# Patient Record
Sex: FEMALE | Race: BLACK OR AFRICAN AMERICAN | NOT HISPANIC OR LATINO | Employment: OTHER | URBAN - METROPOLITAN AREA
[De-identification: names, ages, dates, MRNs, and addresses within clinical notes are randomized per-mention and may not be internally consistent; named-entity substitution may affect disease eponyms.]

---

## 2022-06-09 ENCOUNTER — TELEPHONE (OUTPATIENT)
Dept: FAMILY MEDICINE | Facility: CLINIC | Age: 37
End: 2022-06-09
Payer: COMMERCIAL

## 2022-06-09 NOTE — TELEPHONE ENCOUNTER
Unable to contact pt at listed number  was  contacted regarding patients bleeding.  reports pt has had some bleeding for the last 2-3 days He reports no dysuria,fever,cramping or pain.   states patient is about 3 months pregnant.  encouraged to have patient present to ER if above mentioned symptoms occur. He voices understanding and agreement patient will be seen 6/10    Note routed to   /ERASMO

## 2022-09-26 ENCOUNTER — ANCILLARY PROCEDURE (OUTPATIENT)
Dept: ULTRASOUND IMAGING | Facility: CLINIC | Age: 37
End: 2022-09-26
Attending: FAMILY MEDICINE
Payer: COMMERCIAL

## 2022-09-26 ENCOUNTER — OFFICE VISIT (OUTPATIENT)
Dept: FAMILY MEDICINE | Facility: CLINIC | Age: 37
End: 2022-09-26
Payer: COMMERCIAL

## 2022-09-26 VITALS
TEMPERATURE: 99.2 F | HEART RATE: 83 BPM | BODY MASS INDEX: 35.34 KG/M2 | DIASTOLIC BLOOD PRESSURE: 76 MMHG | OXYGEN SATURATION: 100 % | RESPIRATION RATE: 16 BRPM | SYSTOLIC BLOOD PRESSURE: 117 MMHG | HEIGHT: 64 IN | WEIGHT: 207 LBS

## 2022-09-26 DIAGNOSIS — N93.9 VAGINAL BLEEDING: ICD-10-CM

## 2022-09-26 DIAGNOSIS — N92.6 MISSED PERIOD: Primary | ICD-10-CM

## 2022-09-26 DIAGNOSIS — N93.9 VAGINAL BLEEDING: Primary | ICD-10-CM

## 2022-09-26 LAB
HCG INTACT+B SERPL-ACNC: ABNORMAL MIU/ML
HCG UR QL: POSITIVE
PROGEST SERPL-MCNC: 4.4 NG/ML

## 2022-09-26 PROCEDURE — 84702 CHORIONIC GONADOTROPIN TEST: CPT

## 2022-09-26 PROCEDURE — 84144 ASSAY OF PROGESTERONE: CPT

## 2022-09-26 PROCEDURE — 76801 OB US < 14 WKS SINGLE FETUS: CPT | Mod: TC | Performed by: RADIOLOGY

## 2022-09-26 PROCEDURE — 36415 COLL VENOUS BLD VENIPUNCTURE: CPT

## 2022-09-26 PROCEDURE — 99204 OFFICE O/P NEW MOD 45 MIN: CPT | Mod: 25

## 2022-09-26 PROCEDURE — 81025 URINE PREGNANCY TEST: CPT

## 2022-09-26 NOTE — PROGRESS NOTES
Preceptor Attestation:    I discussed the patient with the resident and evaluated the patient in person. I have verified the content of the note, which accurately reflects my assessment of the patient and the plan of care.   Supervising Physician:  Sean Morris MD.

## 2022-09-26 NOTE — PROGRESS NOTES
Assessment & Plan:    1. Positive urine pregnancy  Vaginal bleeding in pregnancy  Patient new to clinic. Prior OB at . , with hx of early SAB at 4 weeks and recent loss reported at 4 months in 2022. She reports she delivered the infant at home and then presented to the ED for bleeding, but had no follow up for the loss. She reports LMP 2022 so 10-12 weeks estimated. She has had vaginal bleeding with decreasing pregnancy symptoms for the last week. She strongly hopes this pregnancy is successful but we did discuss this could be a miscarriage. She is not currently taking PNV.  - OB US, transvaginal likely to evaluate cervical length and possible dating  - Serum progesterone  - Serum Beta Hcg today, likely repeat   - Will call patient pending US results and discuss next steps  - If intrauterine with closed cervix, will send to [facility] and have her return for new OB.     Yuri Wade is a 37 year old  with hx of 4 week loss in  and recent loss at approx 4 months in 2022. LMP reported to be 2022. She has been having vaginal spotting/bleeding x 1 week and feels it is getting worse. She does wear a pad but it is minimally saturated.   She delivered her prior pregnancy at home, at which time the infant was fully formed. She had a lot of bleeding and presented to the ED but no further work up for the loss was completed. Her other pregnancies were in 2018, 2019, , and .  She strongly desires a 5th child.   Does endorse some mild back pain.   Denies fever, chills, abdominal pain, no urinary symptoms.       Vaginal Bleeding (spotting) and Prenatal Care (Patient reports being 2 months pregnant via 2 at home test, back pain )      Review of Systems   Constitutional, HEENT, cardiovascular, pulmonary, gi and gu systems are negative, except as otherwise noted.      Objective    /76 (BP Location: Left arm, Patient Position: Sitting, Cuff Size: Adult Large)   Pulse 83   Temp  "99.2  F (37.3  C) (Oral)   Resp 16   Ht 1.625 m (5' 3.98\")   Wt 93.9 kg (207 lb)   SpO2 100%   BMI 35.56 kg/m    Body mass index is 35.56 kg/m .  Physical Exam   GENERAL: healthy, alert and no distress  RESP: lungs clear to auscultation  CV: regular rate and rhythm  ABDOMEN: soft, nontender  MS: no gross musculoskeletal defects noted, no edema    I precepted today with Dr. Morris.    Carmita Garcia MD PGY-2        "

## 2022-09-26 NOTE — PATIENT INSTRUCTIONS
Thank you for taking the time to discuss your health with me today!    Today we discussed:  Your pregnancy! Congratulations on your pregnancy! We will check your hormone levels and I will call you with the next steps. Come back for your ultrasound at 3:40 PM and it will give us more information on next steps.    As always, please call the clinic or message with any questions or concerns.     Best Wishes,  Carmita Garcia MD.

## 2022-09-30 ENCOUNTER — TELEPHONE (OUTPATIENT)
Dept: FAMILY MEDICINE | Facility: CLINIC | Age: 37
End: 2022-09-30

## 2022-09-30 NOTE — TELEPHONE ENCOUNTER
Tried to call pt to assist her with rescheduling her appt with Dr Garcia to follow-up her labs and ultrasound.  Pt cancelled her appt for 9/30/22 and did not reschedule. There was no answer.  Will try again on Monday, 10/3/22.  Ultrasound showed an intrauterine gest sac measuring 7 wks but no fetal pole was seen.  Beta HCG quant was 16,316 which is in the 6-7 wk gest age range and progesterone was very low at 4.4,/NG

## 2022-10-03 NOTE — TELEPHONE ENCOUNTER
Tried calling pt and still no answer and unable to leave VM.  Routed note to Dr Garcia to send a letter./NG

## 2023-05-09 LAB
ABO/RH(D): NORMAL
ANTIBODY SCREEN: NEGATIVE
SPECIMEN EXPIRATION DATE: NORMAL
SPECIMEN EXPIRATION DATE: NORMAL

## 2023-05-10 ENCOUNTER — PRENATAL OFFICE VISIT (OUTPATIENT)
Dept: MIDWIFE SERVICES | Facility: CLINIC | Age: 38
End: 2023-05-10
Payer: COMMERCIAL

## 2023-05-10 VITALS
BODY MASS INDEX: 34.3 KG/M2 | SYSTOLIC BLOOD PRESSURE: 104 MMHG | HEIGHT: 64 IN | DIASTOLIC BLOOD PRESSURE: 60 MMHG | WEIGHT: 200.9 LBS | HEART RATE: 96 BPM

## 2023-05-10 DIAGNOSIS — E04.9 ENLARGED THYROID: ICD-10-CM

## 2023-05-10 DIAGNOSIS — Z91.89 AT RISK FOR COMPLICATION OF PREGNANCY: ICD-10-CM

## 2023-05-10 DIAGNOSIS — O09.529 ANTEPARTUM MULTIGRAVIDA OF ADVANCED MATERNAL AGE: ICD-10-CM

## 2023-05-10 DIAGNOSIS — Z34.80 SUPERVISION OF OTHER NORMAL PREGNANCY, ANTEPARTUM: Primary | ICD-10-CM

## 2023-05-10 PROBLEM — O99.810 GLUCOSE INTOLERANCE OF PREGNANCY: Status: RESOLVED | Noted: 2021-10-13 | Resolved: 2023-05-10

## 2023-05-10 PROBLEM — D64.9 ANEMIA, UNSPECIFIED: Status: RESOLVED | Noted: 2021-10-13 | Resolved: 2023-05-10

## 2023-05-10 PROBLEM — D64.9 ANEMIA, UNSPECIFIED: Status: ACTIVE | Noted: 2021-10-13

## 2023-05-10 PROBLEM — O99.810 GLUCOSE INTOLERANCE OF PREGNANCY: Status: ACTIVE | Noted: 2021-10-13

## 2023-05-10 LAB
BASOPHILS # BLD AUTO: 0 10E3/UL (ref 0–0.2)
BASOPHILS NFR BLD AUTO: 0 %
BURR CELLS BLD QL SMEAR: SLIGHT
EOSINOPHIL # BLD AUTO: 0.1 10E3/UL (ref 0–0.7)
EOSINOPHIL NFR BLD AUTO: 1 %
ERYTHROCYTE [DISTWIDTH] IN BLOOD BY AUTOMATED COUNT: 15.9 % (ref 10–15)
HCT VFR BLD AUTO: 31.5 % (ref 35–47)
HGB BLD-MCNC: 9.4 G/DL (ref 11.7–15.7)
IMM GRANULOCYTES # BLD: 0.2 10E3/UL
IMM GRANULOCYTES NFR BLD: 2 %
LYMPHOCYTES # BLD AUTO: 1.8 10E3/UL (ref 0.8–5.3)
LYMPHOCYTES NFR BLD AUTO: 16 %
MCH RBC QN AUTO: 22.8 PG (ref 26.5–33)
MCHC RBC AUTO-ENTMCNC: 29.8 G/DL (ref 31.5–36.5)
MCV RBC AUTO: 76 FL (ref 78–100)
MONOCYTES # BLD AUTO: 0.7 10E3/UL (ref 0–1.3)
MONOCYTES NFR BLD AUTO: 6 %
NEUTROPHILS # BLD AUTO: 8.4 10E3/UL (ref 1.6–8.3)
NEUTROPHILS NFR BLD AUTO: 75 %
NRBC # BLD AUTO: 0 10E3/UL
NRBC BLD AUTO-RTO: 0 /100
PLAT MORPH BLD: ABNORMAL
PLATELET # BLD AUTO: 279 10E3/UL (ref 150–450)
POLYCHROMASIA BLD QL SMEAR: SLIGHT
RBC # BLD AUTO: 4.13 10E6/UL (ref 3.8–5.2)
RBC MORPH BLD: ABNORMAL
WBC # BLD AUTO: 11.2 10E3/UL (ref 4–11)

## 2023-05-10 PROCEDURE — 99207 PR FIRST OB VISIT: CPT | Performed by: ADVANCED PRACTICE MIDWIFE

## 2023-05-10 PROCEDURE — 86762 RUBELLA ANTIBODY: CPT | Performed by: ADVANCED PRACTICE MIDWIFE

## 2023-05-10 PROCEDURE — 86780 TREPONEMA PALLIDUM: CPT | Performed by: ADVANCED PRACTICE MIDWIFE

## 2023-05-10 PROCEDURE — 86900 BLOOD TYPING SEROLOGIC ABO: CPT | Performed by: ADVANCED PRACTICE MIDWIFE

## 2023-05-10 PROCEDURE — 87389 HIV-1 AG W/HIV-1&-2 AB AG IA: CPT | Performed by: ADVANCED PRACTICE MIDWIFE

## 2023-05-10 PROCEDURE — 36415 COLL VENOUS BLD VENIPUNCTURE: CPT | Performed by: ADVANCED PRACTICE MIDWIFE

## 2023-05-10 PROCEDURE — 87086 URINE CULTURE/COLONY COUNT: CPT | Performed by: ADVANCED PRACTICE MIDWIFE

## 2023-05-10 PROCEDURE — 86901 BLOOD TYPING SEROLOGIC RH(D): CPT | Performed by: ADVANCED PRACTICE MIDWIFE

## 2023-05-10 PROCEDURE — 86850 RBC ANTIBODY SCREEN: CPT | Performed by: ADVANCED PRACTICE MIDWIFE

## 2023-05-10 PROCEDURE — 84443 ASSAY THYROID STIM HORMONE: CPT | Performed by: ADVANCED PRACTICE MIDWIFE

## 2023-05-10 PROCEDURE — 86803 HEPATITIS C AB TEST: CPT | Performed by: ADVANCED PRACTICE MIDWIFE

## 2023-05-10 PROCEDURE — 87340 HEPATITIS B SURFACE AG IA: CPT | Performed by: ADVANCED PRACTICE MIDWIFE

## 2023-05-10 PROCEDURE — 85025 COMPLETE CBC W/AUTO DIFF WBC: CPT | Performed by: ADVANCED PRACTICE MIDWIFE

## 2023-05-10 RX ORDER — VITAMIN A ACETATE, BETA CAROTENE, ASCORBIC ACID, CHOLECALCIFEROL, .ALPHA.-TOCOPHEROL ACETATE, DL-, THIAMINE MONONITRATE, RIBOFLAVIN, NIACINAMIDE, PYRIDOXINE HYDROCHLORIDE, FOLIC ACID, CYANOCOBALAMIN, CALCIUM CARBONATE, FERROUS FUMARATE, ZINC OXIDE, CUPRIC OXIDE 3080; 12; 120; 400; 1; 1.84; 3; 20; 22; 920; 25; 200; 27; 10; 2 [IU]/1; UG/1; MG/1; [IU]/1; MG/1; MG/1; MG/1; MG/1; MG/1; [IU]/1; MG/1; MG/1; MG/1; MG/1; MG/1
1 TABLET, FILM COATED ORAL DAILY
COMMUNITY

## 2023-05-10 NOTE — PATIENT INSTRUCTIONS
Pregnancy: Your Second Trimester Changes  Each day, you and your baby are changing and growing together. Here s a quick look at what s happening to both of you.  How you are changing  Even when you don t notice it, your body is adapting to meet the needs of your growing baby. The changes in your body might also affect your moods.  Your body  Your uterus expands as your baby grows. As the weeks go by, you will feel more pressure on your bladder, stomach, and other organs. You may notice some skin color changes on your forehead, nose, or cheeks. Freckles may darken, and moles may grow. You may notice a darker line on your abdomen between your belly button and pubic bone in the midline.  Your moods  The second trimester is often easier than the first. Still, be prepared for mood swings. These are from the increase in hormones made by your body. Hormones are chemicals that affect the way organs work. These mood swings are a normal part of pregnancy.  How your baby is growing    Month 4  Your baby s heartbeat may be heard with a Doppler (handheld ultrasound device) by 9 to 10 weeks. Eyebrows, eyelashes, and fingernails begin to form.    Month 5  You may feel your baby move. After a growth spurt, your baby nears 10 inches.    Month 6  Your baby s fingerprints have formed. Your baby weighs about 1 to 2 pounds and is about 12 inches long.    [employer] last reviewed this educational content on 7/1/2021 2000-2022 [employer]. All rights reserved. This information is not intended as a substitute for professional medical care. Always follow your healthcare professional's instructions.          Adapting to Pregnancy: Second Trimester  Keep up the healthy habits you started in your first trimester. You might be a little more tired than normal. So plan your day wisely. Look at the tips below and choose the ones that suit your lifestyle.   Note  If you have any questions, talk with your healthcare provider.   If  you work  If you can, adjust your work with your employer to fit your needs. Try these tips:     If you stand for long periods, find ways to do some tasks while sitting. Also, try to stand with 1 foot resting on a low stool or ledge. Shift your weight from foot to foot often. Wear low-heeled shoes.    If you sit, keep your knees level with your hips. Rest your feet on a firm surface. Sit tall with support for your low back.    If you work long hours, ask about adjusting your schedule. Try taking shorter breaks more often.  When you travel  The second trimester may be the best time for any travel. Talk to your healthcare provider about any special plans you may need to make. Always:     Wear a seat belt. Fasten the lap part under your belly. Wear the shoulder part also.    Take breaks often during long trips by car or plane. Move around to stretch your legs.    Drink plenty of fluids on flights. The air in plane cabins is very dry.    Stay out of hot climates or high altitudes if you are not used to them.    Stay away from places where the food and water might make you sick.    Make sure you are up-to-date on all vaccines, including the flu vaccine. This is especially important when traveling overseas.  Taking time to relax  Find time to rest and relax at work or at home:     Take short time-outs daily. Do relaxation exercises.    Breathe deeply during stressful times.    Try not to take on too much. Plan tasks for times when you have the most energy.    Take naps when you can. Or just sit and relax.    After week 16, don't lie on your back for more than a few minutes. Instead, lie on your side. Switch sides often.    Having sex  Unless your healthcare provider tells you otherwise, there is no reason to stop having sex now. Blood supply increases to the pelvic area in the second trimester. Because of this, sex might be more enjoyable. Try different positions and see what s best. Also talk with your partner about any  changes in desire. Spotting may happen after sex. Let your healthcare provider know if there is heavy bleeding.   Keeping your environment safe  You can still clean your house and use scented products. Just take some simple precautions:     Wear gloves when using cleaning fluids.    Open windows to let in fresh air. Use a fan if you paint.    Stay away from secondhand smoke.    Don t breathe fumes from nail polish, hair spray, cleansers, or other chemicals.  How daily issues affect your health  Many things in your daily life impact your health. This can include transportation, money problems, housing, access to food, and . If you can t get to medical appointments, you may not receive the care you need. When money is tight, it may be difficult to pay for medicines. And living far from a grocery store can make it hard to buy healthy food.   If you have concerns in any of these or other areas, talk with your healthcare team. They may know of local resources to assist you. Or they may have a staff person who can help.   [employer] last reviewed this educational content on 6/1/2021 2000-2022 [employer]. All rights reserved. This information is not intended as a substitute for professional medical care. Always follow your healthcare professional's instructions.          Relieving Back Pain During Pregnancy: Wall Stretch, Body Bend   Before trying these exercises, talk to your healthcare provider to make sure they are safe for you. Ask your healthcare provider how many times to do each exercise.   Wall stretch  This strengthens and loosens the muscles in your upper back:   1. Lean against a wall with a firm pillow or rolled towel under your shoulder blades. Your feet should be about 12 inches from the wall and shoulder-width apart. Point your chin down.  2. Breathe in. Push your shoulders, neck, and head against the wall. You will feel a stretch in your shoulders.  3. Hold for 5 counts. Then  breathe out, and relax your shoulders and neck.   Body bend  This strengthens your back and buttocks muscles:   1. Stand with your legs shoulder-width apart. Put your hands on your upper thighs and bend your knees slightly.  2. Slowly bend forward at the hips. Push your hips back and keep your shoulders up. Make sure your back is straight. You ll feel a stretch in your upper thighs. You ll also feel your back muscles holding you in position.  3. Hold for 5 counts, then straighten.   [employer] last reviewed this educational content on 7/1/2021 2000-2022 [employer]. All rights reserved. This information is not intended as a substitute for professional medical care. Always follow your healthcare professional's instructions.          Blood Glucose Screening During Pregnancy  Gestational diabetes is diabetes that only pregnant women get. Changes in your body during pregnancy can cause high blood sugar (glucose). This can cause problems for you and your baby. It is a serious condition. But it can be controlled.     Your healthcare provider will talk with you about blood glucose screening.     Who is at risk for gestational diabetes?  You are at risk of getting gestational diabetes if any of the risk factors below apply to you. The risk for this condition gets higher as your number of risk factors increases:    You are , , , , or .    You weigh more than your healthcare provider says is healthy for you.    You have a relative with diabetes.    You are older than 25.    You had gestational diabetes during a past pregnancy.    You had a stillbirth or a very large baby before.    You have a history of abnormal glucose tolerance.    You have sugar in your urine at the first prenatal visit.    You have metabolic syndrome, PCOS (polycystic ovary syndrome), are using glucocorticoids, or have high blood pressure.    You are pregnant with twins or  more  What happens during a screening?  Here is what to expect during a blood glucose screening:    There is conflicting advice for screening. But the American College of Obstetricians and Gynecologists currently advises that all pregnant women be screened for gestational diabetes. When you are screened depends on your risk. Women are tested at 24 to 28 weeks of pregnancy. Women at high risk may be tested when they first learn they are pregnant.    To do the screening, a blood sample is taken. Your blood sugar level is measured.    If the results show a high blood sugar level, a glucose tolerance test may be ordered. You will drink a certain amount of sugar. This test measures how long it takes for sugar to leave your blood. The test will show if you have gestational diabetes.  What to know if you test positive  Here are some things you need to know:    Gestational diabetes can be treated. The best way to control it is to find out you have it early and start treatment quickly.    This condition can cause problems for the mother during pregnancy. It can also cause problems with the baby during pregnancy, delivery, and after. Treatment greatly lowers the chance for problems.    The changes in your body that cause gestational diabetes normally happen only when you are pregnant. After the baby is born, your body goes back to normal. The condition goes away. But you may be more likely to have type 2 diabetes later. Talk with your healthcare provider about ways to help prevent type 2 diabetes.  Treating gestational diabetes  Here is how to treat gestational diabetes:    You ll need to check your blood sugar often. You can do this at home. Prick your finger and check a drop of blood on a glucose monitor. Your healthcare provider will show you how and when to check your blood sugar. They will talk about your target blood sugar level.    To manage your blood sugar, you will be given a special plan. It will likely include  meal planning and getting regular exercise. Some women need to take a hormone called insulin. Others may take medicine to help control their blood sugar.  [employer] last reviewed this educational content on 8/1/2020 2000-2022 [employer]. All rights reserved. This information is not intended as a substitute for professional medical care. Always follow your healthcare professional's instructions.

## 2023-05-10 NOTE — PROGRESS NOTES
PRENATAL VISIT   FIRST OBSTETRICAL EXAM - OB      Assessment / Impression   First prenatal visit at unknown gestation, LMP: Nov or Dec 2022  38 year old   AMA  Pre-pregnant BMI 34.8  No evidence of  mood disorder  Enlarged Thyroid, Hx nodules  Hx Anemia  Poor fluid intake, eats ice  Likely Anemic now  Hx elevated GCT in pregnancy in  (140 mg/dL), never took the 3-hour GTT and was not diagnosed with GDM    Plan:   -Discussed and ordered dating US and FAS if possible to complete based on dating.  -IOB labs drawn including TSH reflex. -Next visit, ask if she has met with endocrinology elsewhere since her thyroid imaging in . Encourage endocrinology referral if not connected yet.  -Pt is not a candidate for drawing lead level per [facility] screening tool.   -Patient is not interested in waterbirth.    -Reviewed prenatal care schedule.   -Optimal nutrition and weight gain discussed. Pregnancy weight gain of 2-11 lbs (BMI 35-39.9) encouraged.   -Anticipatory guidance for common pregnancy questions and concerns reviewed.   -Danger s/sx for this trimester reviewed with patient.   -Reviewed genetic and carrier screening options with patient; she is undecided at this time. Has not had genetic screening in the past. Will let us know if she decides.  -IOB packet given and reviewed with patient.   -CNM services and hospital options reviewed; emergency and scheduling phone numbers given to patient.   -Because the patient does have 2 or more moderate risk factors, we discussed the benefit of starting low-dose aspirin even though she is likely further than 16 weeks gestation, however she is declining this recommendation.  -Antepartum VTE risk factors absent.  -Patient is not at increased risk for overt diabetes, so early 1hr GCT will be added to IOB labs today.  -Pt is not a candidate for an antepartum OB consult.    -Return to clinic in 4 weeks or sooner as needed.  -Strongly encouraged increasing her water  intake as well as taking a PNV and focusing on iron rich foods.     Total time: 40 minutes spent on the date of the encounter doing chart review, review of test results, patient visit and documentation.     Subjective:   Mauro Irizarry is a 38 year old  here today for her first obstetrical exam at unknown. Here with FOB Adnan. The patient reports nausea, rare vomiting, fatigue and some mild breast tenderness. She is usually nauseated throughout all of her pregnancies and does not drink very much water. Does eat ice and drinks juice. Has headaches and dry skin and discussed this is likely due to dehydration. States she has never been a water drinker and likely will not be able to increase water intake. She has tried fruit in water and other methods to increase water in the past but still struggles.  Uncertain LMP in November or December. States she had insurance plan change so that is reason for late start to prenatal care. Has been feeling fetal movement for about 1 month now.    States her thyroid actually feels smaller to her at this time. Was enlarged in the past and this is evident today.    Offered GC/CT screening today and patient accepts for next visit via urine.    Risk factors: pre-pregnancy obesity. Pregnancy Risk Factors:Age is <18 or >35 and Initial prenatal visit >20 weeks (likely >20 weeks)    The patient has the following high risk factors for preeclampsia:none. The patient has the following moderate risk factors for preeclampsia:maternal age 35+, BMI greater than 30 and sociodemographic characteristics (-American, low SES).     The patient has the following antepartum risk factors for VTE (two or more risk factors, or 1 * risk factor, places patient at higher risk): none.     Clinical history/risk factors requiring antepartum OB consult: none.     The patient has the following risk factors for overt diabetes: Body mass index greater than or equal to 25 kg/m2. Plus the additional risk  factor(s) of: High-risk race/ethnicity (eg, , , ,  American, ) and No additional risk factors.    Social History:   Education level: HS   Occupation: [employer]   Partners name: Jeb   ?   OB History    Para Term  AB Living   8 4 4 0 3 4   SAB IAB Ectopic Multiple Live Births   3 0 0 0 4      # Outcome Date GA Lbr Mal/2nd Weight Sex Delivery Anes PTL Lv   8 Current            7 2022     SAB      6 2022 5w0d    SAB      5 Term 10/14/21 39w5d 04:50 / 00:16 3.43 kg (7 lb 9 oz) M Vag-Spont  N JAJA      Name: NOLAYNE VALERIOBOY NAJLA      Apgar1: 9  Apgar5: 9   4 Term 20 38w4d 03:55 / 00:03 3.037 kg (6 lb 11.1 oz) F Vag-Spont None N JAJA      Name: NOOR,BABYGIRL NAJLA      Apgar1: 8  Apgar5: 9   3 Term 19 40w2d / 00:17 3.65 kg (8 lb 0.8 oz) M Vag-Spont  N JAJA      Name: NOLAYNE VALERIOBOY NAJLA      Apgar1: 9  Apgar5: 9   2 SAB 2018 4w0d          1 Term 18 38w1d 05:40 / 00:24 2.52 kg (5 lb 8.9 oz) M Vag-Spont  N JAJA      Name: Dalton      Apgar1: 9  Apgar5: 9        History:   Past Medical History:   Diagnosis Date     Anemia, unspecified 10/13/2021    Formatting of this note might be different from the original. 8.6 on admission     Glucose intolerance of pregnancy 10/13/2021    Failed GCT- no GTT      Past Surgical History:   Procedure Laterality Date     NO PAST SURGERIES        Family History   Problem Relation Age of Onset     No Known Problems Mother      Hypertension Father      No Known Problems Son      No Known Problems Son      No Known Problems Son      No Known Problems Daughter       Social History     Tobacco Use     Smoking status: Never     Passive exposure: Never     Smokeless tobacco: Never   Substance Use Topics     Alcohol use: Never     Drug use: Never      Current Outpatient Medications   Medication Sig Dispense Refill     Prenatal Vit-Fe Fumarate-FA (PRENATAL PLUS) 27-1 MG TABS Take 1 tablet by mouth daily        No  "Known Allergies     The patient's medical, surgical and family histories were reviewed.    Pap smear: Last Pap: 2017, Result: normal, Any history of abnormal: no. Next Due: Desires to be done postpartum.   EPDS score today: 0.\"never\" to #10   History of anxiety or depression: no    Review of Systems   General: Fatigue but otherwise denies problem   Eyes: Denies problem   Ears/Nose/Throat: Denies problem   Cardiovascular: Denies problem   Respiratory: Denies problem   Gastrointestinal: Nausea and rare vomiting  Genitourinary: Denies any discharge, vaginal bleeding or itchiness  Musculoskeletal: Breast tenderness otherwise denies problem  Skin: Denies problem   Neurologic: Denies problem   Psychiatric: Denies problem   Endocrine: Denies problem   Heme/Lymphatic: Denies problem   Allergic/Immunologic: Denies problem         Objective:   Objective    Vitals:    05/10/23 1524   BP: 104/60   Pulse: 96   Weight: 91.1 kg (200 lb 14.4 oz)   Height: 1.613 m (5' 3.5\")        Physical Exam:   General Appearance: Alert, cooperative, no distress, appears stated age   HEENT: Normocephalic, without obvious abnormality, atraumatic. Conjunctiva/corneas clear.  Neck:soft, supple, trachea midline  Thyroid: enlarged bilaterally, symmetric, no tenderness (Hx nodes)  Back: Symmetric, no curvature, ROM normal, no CVA tenderness   Lungs: Clear to auscultation bilaterally, respirations unlabored   Heart: Regular rate and rhythm, S1 and S2 normal, no murmur, rub, or gallop. No edema to lower extremities.   Breasts: deferred.  Abdomen: Gravid, soft, non-tender, bowel sounds active all four quadrants, no masses.   FHT: 150  Pelvic: deferred, asymptomatic.  Musculoskeletal: Extremities normal, atraumatic, no cyanosis   Skin: Skin color, texture, turgor normal, no rashes or lesions   Lymph nodes: Cervical, supraclavicular, and axillary nodes normal   Neurologic: Alert and oriented x 3. Normal speech       Leda Quiñones CNM      After visit " chart review:  Has had normal thyroid lab work in the past and an US on 8/16/2021 showing the following:  Nodule 1: There is a 6.7 x 5.0 x 2.8 cm nodule involving the right lobe of the thyroid.   Composition: Solid or almost completely solid, 2 points   Echogenicity: Hyperechoic or isoechoic, 1 point   Shape: Wider-than-tall, 0 points   Margin: Smooth, 0 points   Echogenic Foci: None, or large comet-tail artifacts, 0 points   Point Total: 3 points. TI-RADS 3. If 2.5 cm or larger, recommend FNA; if 1.5 cm or larger, recommend follow up US at 1, 3, and 5 years  IMPRESSION:   A 6.7 cm TI-RADS 3 right lobe thyroid nodule. This meets criteria for fine-needle aspiration.

## 2023-05-11 ENCOUNTER — PATIENT OUTREACH (OUTPATIENT)
Dept: ONCOLOGY | Facility: CLINIC | Age: 38
End: 2023-05-11
Payer: COMMERCIAL

## 2023-05-11 ENCOUNTER — TELEPHONE (OUTPATIENT)
Dept: MIDWIFE SERVICES | Facility: CLINIC | Age: 38
End: 2023-05-11
Payer: COMMERCIAL

## 2023-05-11 DIAGNOSIS — O99.019 ANTEPARTUM ANEMIA: Primary | ICD-10-CM

## 2023-05-11 PROBLEM — Z91.89 AT RISK FOR COMPLICATION OF PREGNANCY: Status: ACTIVE | Noted: 2023-05-11

## 2023-05-11 LAB
HBV SURFACE AG SERPL QL IA: NONREACTIVE
HCV AB SERPL QL IA: NONREACTIVE
HIV 1+2 AB+HIV1 P24 AG SERPL QL IA: NONREACTIVE
RUBV IGG SERPL QL IA: 6.52 INDEX
RUBV IGG SERPL QL IA: POSITIVE
T PALLIDUM AB SER QL: NONREACTIVE
TSH SERPL DL<=0.005 MIU/L-ACNC: 1.45 UIU/ML (ref 0.3–4.2)

## 2023-05-11 NOTE — PROGRESS NOTES
Referral received for benign heme services, see below.    Referral reason: Anemia; abnormal RBC morphology in setting of pregnant patient    Current abnormal labs: Available in Chart Review    Outreach: Call not placed to patient regarding referral.    Plan: Triage instructions updated and sent to NPS for completion.

## 2023-05-12 LAB — BACTERIA UR CULT: NORMAL

## 2023-06-05 ENCOUNTER — HOSPITAL ENCOUNTER (OUTPATIENT)
Dept: ULTRASOUND IMAGING | Facility: CLINIC | Age: 38
Discharge: HOME | End: 2023-06-05
Attending: ADVANCED PRACTICE MIDWIFE | Admitting: ADVANCED PRACTICE MIDWIFE
Payer: COMMERCIAL

## 2023-06-05 DIAGNOSIS — O09.529 ANTEPARTUM MULTIGRAVIDA OF ADVANCED MATERNAL AGE: ICD-10-CM

## 2023-06-05 DIAGNOSIS — Z34.80 SUPERVISION OF OTHER NORMAL PREGNANCY, ANTEPARTUM: ICD-10-CM

## 2023-06-05 PROCEDURE — 76805 OB US >/= 14 WKS SNGL FETUS: CPT

## 2023-06-06 ENCOUNTER — DOCUMENTATION ONLY (OUTPATIENT)
Dept: MIDWIFE SERVICES | Facility: CLINIC | Age: 38
End: 2023-06-06
Payer: COMMERCIAL

## 2023-06-06 DIAGNOSIS — Z34.93: ICD-10-CM

## 2023-06-14 ENCOUNTER — PRENATAL OFFICE VISIT (OUTPATIENT)
Dept: MIDWIFE SERVICES | Facility: CLINIC | Age: 38
End: 2023-06-14
Payer: COMMERCIAL

## 2023-06-14 ENCOUNTER — ONCOLOGY VISIT (OUTPATIENT)
Dept: ONCOLOGY | Facility: HOSPITAL | Age: 38
End: 2023-06-14
Attending: ADVANCED PRACTICE MIDWIFE
Payer: COMMERCIAL

## 2023-06-14 VITALS
WEIGHT: 202.2 LBS | HEIGHT: 64 IN | SYSTOLIC BLOOD PRESSURE: 123 MMHG | DIASTOLIC BLOOD PRESSURE: 56 MMHG | BODY MASS INDEX: 34.52 KG/M2 | RESPIRATION RATE: 18 BRPM | OXYGEN SATURATION: 98 % | TEMPERATURE: 98.5 F | HEART RATE: 102 BPM

## 2023-06-14 VITALS
DIASTOLIC BLOOD PRESSURE: 56 MMHG | BODY MASS INDEX: 34.54 KG/M2 | WEIGHT: 202.3 LBS | HEIGHT: 64 IN | SYSTOLIC BLOOD PRESSURE: 100 MMHG | HEART RATE: 100 BPM

## 2023-06-14 DIAGNOSIS — D50.8 OTHER IRON DEFICIENCY ANEMIA: ICD-10-CM

## 2023-06-14 DIAGNOSIS — D50.8 OTHER IRON DEFICIENCY ANEMIA: Primary | ICD-10-CM

## 2023-06-14 DIAGNOSIS — D50.9 IRON DEFICIENCY ANEMIA, UNSPECIFIED IRON DEFICIENCY ANEMIA TYPE: ICD-10-CM

## 2023-06-14 DIAGNOSIS — O99.019 ANTEPARTUM ANEMIA: ICD-10-CM

## 2023-06-14 DIAGNOSIS — L29.9 ITCHING: ICD-10-CM

## 2023-06-14 DIAGNOSIS — E04.1 THYROID NODULE: ICD-10-CM

## 2023-06-14 DIAGNOSIS — Z34.80 SUPERVISION OF OTHER NORMAL PREGNANCY, ANTEPARTUM: Primary | ICD-10-CM

## 2023-06-14 LAB
ERYTHROCYTE [DISTWIDTH] IN BLOOD BY AUTOMATED COUNT: 16.5 % (ref 10–15)
FERRITIN SERPL-MCNC: 5 NG/ML (ref 6–175)
GLUCOSE 1H P 50 G GLC PO SERPL-MCNC: 117 MG/DL (ref 70–129)
HCT VFR BLD AUTO: 29.7 % (ref 35–47)
HGB BLD-MCNC: 8.8 G/DL (ref 11.7–15.7)
HGB BLD-MCNC: 8.8 G/DL (ref 11.7–15.7)
IRON BINDING CAPACITY (ROCHE): 520 UG/DL (ref 240–430)
IRON SATN MFR SERPL: 5 % (ref 15–46)
IRON SERPL-MCNC: 27 UG/DL (ref 37–145)
MCH RBC QN AUTO: 21.7 PG (ref 26.5–33)
MCHC RBC AUTO-ENTMCNC: 29.6 G/DL (ref 31.5–36.5)
MCV RBC AUTO: 73 FL (ref 78–100)
PLATELET # BLD AUTO: 239 10E3/UL (ref 150–450)
RBC # BLD AUTO: 4.06 10E6/UL (ref 3.8–5.2)
WBC # BLD AUTO: 11 10E3/UL (ref 4–11)

## 2023-06-14 PROCEDURE — 36415 COLL VENOUS BLD VENIPUNCTURE: CPT | Performed by: MIDWIFE

## 2023-06-14 PROCEDURE — 99203 OFFICE O/P NEW LOW 30 MIN: CPT | Performed by: INTERNAL MEDICINE

## 2023-06-14 PROCEDURE — 82950 GLUCOSE TEST: CPT | Performed by: MIDWIFE

## 2023-06-14 PROCEDURE — 83550 IRON BINDING TEST: CPT | Performed by: MIDWIFE

## 2023-06-14 PROCEDURE — 85018 HEMOGLOBIN: CPT | Mod: 59 | Performed by: MIDWIFE

## 2023-06-14 PROCEDURE — 82728 ASSAY OF FERRITIN: CPT | Performed by: MIDWIFE

## 2023-06-14 PROCEDURE — 83540 ASSAY OF IRON: CPT | Performed by: MIDWIFE

## 2023-06-14 PROCEDURE — 87591 N.GONORRHOEAE DNA AMP PROB: CPT | Performed by: MIDWIFE

## 2023-06-14 PROCEDURE — 82306 VITAMIN D 25 HYDROXY: CPT | Performed by: MIDWIFE

## 2023-06-14 PROCEDURE — G0463 HOSPITAL OUTPT CLINIC VISIT: HCPCS | Performed by: INTERNAL MEDICINE

## 2023-06-14 PROCEDURE — 86780 TREPONEMA PALLIDUM: CPT | Performed by: MIDWIFE

## 2023-06-14 PROCEDURE — 85027 COMPLETE CBC AUTOMATED: CPT | Performed by: MIDWIFE

## 2023-06-14 PROCEDURE — 87491 CHLMYD TRACH DNA AMP PROBE: CPT | Performed by: MIDWIFE

## 2023-06-14 PROCEDURE — 99207 PR PRENATAL VISIT: CPT | Performed by: MIDWIFE

## 2023-06-14 RX ORDER — OMEPRAZOLE 20 MG/1
20 TABLET, DELAYED RELEASE ORAL DAILY
COMMUNITY

## 2023-06-14 RX ORDER — DIPHENHYDRAMINE HYDROCHLORIDE 50 MG/ML
50 INJECTION INTRAMUSCULAR; INTRAVENOUS
Status: CANCELLED
Start: 2023-06-21

## 2023-06-14 RX ORDER — ACETAMINOPHEN 500 MG
500-1000 TABLET ORAL EVERY 6 HOURS PRN
COMMUNITY

## 2023-06-14 RX ORDER — MEPERIDINE HYDROCHLORIDE 25 MG/ML
25 INJECTION INTRAMUSCULAR; INTRAVENOUS; SUBCUTANEOUS EVERY 30 MIN PRN
Status: CANCELLED | OUTPATIENT
Start: 2023-06-21

## 2023-06-14 RX ORDER — ALBUTEROL SULFATE 0.83 MG/ML
2.5 SOLUTION RESPIRATORY (INHALATION)
Status: CANCELLED | OUTPATIENT
Start: 2023-06-21

## 2023-06-14 RX ORDER — MULTIVIT WITH MINERALS/LUTEIN
250 TABLET ORAL DAILY
Qty: 90 TABLET | Refills: 3 | Status: SHIPPED | OUTPATIENT
Start: 2023-06-14

## 2023-06-14 RX ORDER — EPINEPHRINE 1 MG/ML
0.3 INJECTION, SOLUTION INTRAMUSCULAR; SUBCUTANEOUS EVERY 5 MIN PRN
Status: CANCELLED | OUTPATIENT
Start: 2023-06-21

## 2023-06-14 RX ORDER — METHYLPREDNISOLONE SODIUM SUCCINATE 125 MG/2ML
125 INJECTION, POWDER, LYOPHILIZED, FOR SOLUTION INTRAMUSCULAR; INTRAVENOUS
Status: CANCELLED
Start: 2023-06-21

## 2023-06-14 RX ORDER — ALBUTEROL SULFATE 90 UG/1
1-2 AEROSOL, METERED RESPIRATORY (INHALATION)
Status: CANCELLED
Start: 2023-06-21

## 2023-06-14 RX ORDER — LORATADINE 10 MG/1
10 TABLET ORAL DAILY
Qty: 90 TABLET | Refills: 0 | Status: SHIPPED | OUTPATIENT
Start: 2023-06-14

## 2023-06-14 RX ORDER — HEPARIN SODIUM,PORCINE 10 UNIT/ML
5-20 VIAL (ML) INTRAVENOUS DAILY PRN
Status: CANCELLED | OUTPATIENT
Start: 2023-06-21

## 2023-06-14 RX ORDER — LANOLIN ALCOHOL/MO/W.PET/CERES
1000 CREAM (GRAM) TOPICAL DAILY
Qty: 90 TABLET | Refills: 3 | Status: SHIPPED | OUTPATIENT
Start: 2023-06-14

## 2023-06-14 RX ORDER — HEPARIN SODIUM (PORCINE) LOCK FLUSH IV SOLN 100 UNIT/ML 100 UNIT/ML
5 SOLUTION INTRAVENOUS
Status: CANCELLED | OUTPATIENT
Start: 2023-06-21

## 2023-06-14 ASSESSMENT — PAIN SCALES - GENERAL: PAINLEVEL: NO PAIN (0)

## 2023-06-14 NOTE — PROGRESS NOTES
"Oncology Rooming Note    June 14, 2023 3:10 PM   Mauro Irizarry is a 38 year old female who presents for:    Chief Complaint   Patient presents with     Hematology     Antepartum Anemia     Initial Vitals: /56   Pulse 102   Temp 98.5  F (36.9  C)   Resp 18   Ht 1.626 m (5' 4\")   Wt 91.7 kg (202 lb 3.2 oz)   LMP  (LMP Unknown)   SpO2 98%   BMI 34.71 kg/m   Estimated body mass index is 34.71 kg/m  as calculated from the following:    Height as of this encounter: 1.626 m (5' 4\").    Weight as of this encounter: 91.7 kg (202 lb 3.2 oz). Body surface area is 2.03 meters squared.  No Pain (0) Comment: Data Unavailable   No LMP recorded (lmp unknown). Patient is pregnant.  Allergies reviewed: Yes  Medications reviewed: Yes    Medications: Medication refills not needed today.  Pharmacy name entered into [employer]:    [id number] - [place], [place] - [address] [place] [address] & [facility] #[id number] - [place], [place] - [address]    Clinical concerns: None       Sudha Rivera LPN            "

## 2023-06-14 NOTE — LETTER
6/14/2023         RE: Mauro Irizarry  [address]  [place] [address]        Dear Colleague,    Thank you for referring your patient, Mauro Irizarry, to the [facility] [place]. Please see a copy of my visit note below.    [facility] Hematology and Oncology Consult Note    Patient: Mauro Irizarry  MRN: [id number]  Date of Service: 06/14/2023      Reason for Visit    Chief Complaint   Patient presents with     Hematology     Antepartum Anemia         Assessment/Plan    Problem List Items Addressed This Visit    None  Visit Diagnoses     Other iron deficiency anemia    -  Primary    Relevant Orders    Iron & Iron Binding Capacity    Ferritin    Antepartum anemia            Iron deficiency anemia in the setting of pregnancy  I reviewed her labs today.  Hemoglobin came back at 8.8.  MCV 73.  Normal white count and normal platelet counts.  Iron studies show a transferrin saturation of 5%.  TIBC is up at 520.  Total iron is 27 and ferritin is 5.  She has classical iron deficiency.  Is currently not on any oral iron supplementation.  She is pretty symptomatic.  I recommended IV iron with IV Venofer x3.  Repeat labs 3 weeks after the last infusion.  I also strongly encouraged her to continue oral iron supplementation.  She is agreeable to the plan.  I reviewed the potential side effects and complications associated with iron infusions.  She understands the risks.  She also understands the benefits.  We will schedule infusions as soon as possible.  I will see her back in 2 months with repeat labs.    ECOG Performance    0 - Independent    Problem List    Patient Active Problem List   Diagnosis     Enlarged thyroid     Supervision of other normal pregnancy, antepartum     Antepartum multigravida of advanced maternal age     At risk for complication of pregnancy     BMI 34.0-34.9,adult     Suboptimally dated pregnancy      ______________________________________________________________________________    Staging History    Cancer Staging   No matching staging information was found for the patient.      History of presenting illness:  Mauro Irizarry is a 28-year-old female who is currently pregnant with her fifth child and in her third trimester who has been referred by her obstetrics provider for further management of antepartum anemia.    This is her eighth pregnancy.  She has 4 kids.  Has 3 abortions.  She has not been taking any prenatal vitamins.  Lab studies back in May showed anemia with hemoglobin of 9.4.  MCV was 76.  Iron studies were not done.  She complains of some fatigue.  Has shortness of breath on exertion.  Denies any vaginal bleeding.  Denies any rectal bleeding.  She also has history of 3 abortions.  No prior history of any blood clots.  Never smoker.  No alcohol use.    Past History    Past Medical History:   Diagnosis Date     Anemia, unspecified 10/13/2021    Formatting of this note might be different from the original. 8.6 on admission     Glucose intolerance of pregnancy 10/13/2021    Failed GCT- no GTT    Family History   Problem Relation Age of Onset     No Known Problems Mother      Hypertension Father      No Known Problems Son      No Known Problems Son      No Known Problems Son      No Known Problems Daughter       Past Surgical History:   Procedure Laterality Date     NO PAST SURGERIES      Social History     Socioeconomic History     Marital status:      Spouse name: Jeb     Number of children: 4     Years of education: Not on file     Highest education level: High school graduate   Occupational History     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   Tobacco Use     Smoking status: Never     Passive exposure: Never     Smokeless tobacco: Never   Vaping Use     Vaping status: Not on file   Substance and Sexual Activity     Alcohol use: Never     Drug use: Never     Sexual activity: Yes     Partners: Male    Other Topics Concern     Not on file   Social History Narrative     Not on file     Social Determinants of Health     Financial Resource Strain: Not on file   Food Insecurity: Not on file   Transportation Needs: Not on file   Physical Activity: Not on file   Stress: Not on file   Social Connections: Not on file   Intimate Partner Violence: Not on file   Housing Stability: Not on file        Allergies    No Known Allergies    Review of Systems    Pertinent items are noted in HPI.      Physical Exam        6/14/2023     1:16 PM   Oncology Vitals   Height 161 cm   Weight 91.763 kg   BSA (m2) 2.03 m2   /56   Pulse 100       General: alert and cooperative  HEENT: Head: Normal, normocephalic, atraumatic.  Eye: Normal external eye, conjunctiva, lids cornea, KEL.  Extremities: atraumatic, no peripheral edema  CNS: Alert and oriented x3, neurologic exam grossly normal.        Lab Results    Recent Results (from the past 168 hour(s))   Hemoglobin   Result Value Ref Range    Hemoglobin 8.8 (L) 11.7 - 15.7 g/dL   Glucose tolerance gest screen 1 hour   Result Value Ref Range    Glu Gest Screen 1hr 50g 117 70 - 129 mg/dL       Imaging Results    US OB > 14 Weeks    Result Date: 6/6/2023  EXAM: US OB > 14 WEEKS LOCATION: [facility] DATE/TIME: 6/5/2023 6:03 PM CDT INDICATION: Fetal anatomy scan, dating as well please. Unsure LMP (November?) COMPARISON: Ultrasound obstetric <14 weeks single gestation transabdominal study 09/26/2022 TECHNIQUE: Routine. FINDINGS: Single intrauterine gestation, longitudinal lie and cephalic presentation. Placenta is located anterior fundal, without previa. Amniotic fluid is normal, MVP 6.3 cm. Uterus is normal. Maternal adnexa (right and left ovaries) show no abnormalities. FETAL ANOMALY SCREEN: Survey of the fetal anatomy is normal. Specifically, normal posterior fossa, lateral ventricles, spine, stomach, bladder, kidneys, cord insertion, three-vessel cord,  "four-chamber heart, outflow tracts, extremities, face, and diaphragm.  BIOMETRY: Biparietal Diameter: 7.8 cm, 31 weeks 4 days Head Circumference: 28.6 cm, 31 weeks 4 days Abdominal Circumference: 26.0 cm, 30 weeks 1 day Femur Length: 6.2 cm, 32 weeks 1 day Estimated Fetal Weight: 1680 g Fetal Heart Rate: 146 bpm Cervical Length: 5.3 cm, no funneling EDC by This US exam: 08/04/2023 Composite Age by This US: 31 weeks 3 days     IMPRESSION:  1.  Single living intrauterine gestation. 2.  Based on current study, composite age of 31 weeks 3 days with EDC 08/04/2023. 3.  Normal fetal survey.      A total of 30 minutes was spent today on this visit including face to face conversation with the patient, EMR review (labs, imaging studies, pathology reports and outside records), counseling and care co-ordination and documentation.    Signed by: Percy Vasques MD      Oncology Rooming Note    June 14, 2023 3:10 PM   Mauro Irizarry is a 38 year old female who presents for:    Chief Complaint   Patient presents with     Hematology     Antepartum Anemia     Initial Vitals: /56   Pulse 102   Temp 98.5  F (36.9  C)   Resp 18   Ht 1.626 m (5' 4\")   Wt 91.7 kg (202 lb 3.2 oz)   LMP  (LMP Unknown)   SpO2 98%   BMI 34.71 kg/m   Estimated body mass index is 34.71 kg/m  as calculated from the following:    Height as of this encounter: 1.626 m (5' 4\").    Weight as of this encounter: 91.7 kg (202 lb 3.2 oz). Body surface area is 2.03 meters squared.  No Pain (0) Comment: Data Unavailable   No LMP recorded (lmp unknown). Patient is pregnant.  Allergies reviewed: Yes  Medications reviewed: Yes    Medications: Medication refills not needed today.  Pharmacy name entered into [employer]:    [id number] - [place], [place] - [address] [id number] - [place], [place] - [address]    Clinical concerns: None       Sudha Rivera, " LPN                Again, thank you for allowing me to participate in the care of your patient.        Sincerely,        Percy Vasques MD

## 2023-06-14 NOTE — PROGRESS NOTES
[facility] Hematology and Oncology Consult Note    Patient: Mauro Irizarry  MRN: [id number]  Date of Service: 06/14/2023      Reason for Visit    Chief Complaint   Patient presents with     Hematology     Antepartum Anemia         Assessment/Plan    Problem List Items Addressed This Visit    None  Visit Diagnoses     Other iron deficiency anemia    -  Primary    Relevant Orders    Iron & Iron Binding Capacity    Ferritin    Antepartum anemia            Iron deficiency anemia in the setting of pregnancy  I reviewed her labs today.  Hemoglobin came back at 8.8.  MCV 73.  Normal white count and normal platelet counts.  Iron studies show a transferrin saturation of 5%.  TIBC is up at 520.  Total iron is 27 and ferritin is 5.  She has classical iron deficiency.  Is currently not on any oral iron supplementation.  She is pretty symptomatic.  I recommended IV iron with IV Venofer x3.  Repeat labs 3 weeks after the last infusion.  I also strongly encouraged her to continue oral iron supplementation.  She is agreeable to the plan.  I reviewed the potential side effects and complications associated with iron infusions.  She understands the risks.  She also understands the benefits.  We will schedule infusions as soon as possible.  I will see her back in 2 months with repeat labs.    ECOG Performance    0 - Independent    Problem List    Patient Active Problem List   Diagnosis     Enlarged thyroid     Supervision of other normal pregnancy, antepartum     Antepartum multigravida of advanced maternal age     At risk for complication of pregnancy     BMI 34.0-34.9,adult     Suboptimally dated pregnancy     ______________________________________________________________________________    Staging History    Cancer Staging   No matching staging information was found for the patient.      History of presenting illness:  Mauro Irizarry is a 28-year-old female who is currently pregnant with her fifth child and in her third trimester  who has been referred by her obstetrics provider for further management of antepartum anemia.    This is her eighth pregnancy.  She has 4 kids.  Has 3 abortions.  She has not been taking any prenatal vitamins.  Lab studies back in May showed anemia with hemoglobin of 9.4.  MCV was 76.  Iron studies were not done.  She complains of some fatigue.  Has shortness of breath on exertion.  Denies any vaginal bleeding.  Denies any rectal bleeding.  She also has history of 3 abortions.  No prior history of any blood clots.  Never smoker.  No alcohol use.    Past History    Past Medical History:   Diagnosis Date     Anemia, unspecified 10/13/2021    Formatting of this note might be different from the original. 8.6 on admission     Glucose intolerance of pregnancy 10/13/2021    Failed GCT- no GTT    Family History   Problem Relation Age of Onset     No Known Problems Mother      Hypertension Father      No Known Problems Son      No Known Problems Son      No Known Problems Son      No Known Problems Daughter       Past Surgical History:   Procedure Laterality Date     NO PAST SURGERIES      Social History     Socioeconomic History     Marital status:      Spouse name: Jeb     Number of children: 4     Years of education: Not on file     Highest education level: High school graduate   Occupational History     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   Tobacco Use     Smoking status: Never     Passive exposure: Never     Smokeless tobacco: Never   Vaping Use     Vaping status: Not on file   Substance and Sexual Activity     Alcohol use: Never     Drug use: Never     Sexual activity: Yes     Partners: Male   Other Topics Concern     Not on file   Social History Narrative     Not on file     Social Determinants of Health     Financial Resource Strain: Not on file   Food Insecurity: Not on file   Transportation Needs: Not on file   Physical Activity: Not on file   Stress: Not on file   Social Connections: Not on file   Intimate  Partner Violence: Not on file   Housing Stability: Not on file        Allergies    No Known Allergies    Review of Systems    Pertinent items are noted in HPI.      Physical Exam        6/14/2023     1:16 PM   Oncology Vitals   Height 161 cm   Weight 91.763 kg   BSA (m2) 2.03 m2   /56   Pulse 100       General: alert and cooperative  HEENT: Head: Normal, normocephalic, atraumatic.  Eye: Normal external eye, conjunctiva, lids cornea, KEL.  Extremities: atraumatic, no peripheral edema  CNS: Alert and oriented x3, neurologic exam grossly normal.        Lab Results    Recent Results (from the past 168 hour(s))   Hemoglobin   Result Value Ref Range    Hemoglobin 8.8 (L) 11.7 - 15.7 g/dL   Glucose tolerance gest screen 1 hour   Result Value Ref Range    Glu Gest Screen 1hr 50g 117 70 - 129 mg/dL       Imaging Results    US OB > 14 Weeks    Result Date: 6/6/2023  EXAM: US OB > 14 WEEKS LOCATION: [facility] DATE/TIME: 6/5/2023 6:03 PM CDT INDICATION: Fetal anatomy scan, dating as well please. Unsure LMP (November?) COMPARISON: Ultrasound obstetric <14 weeks single gestation transabdominal study 09/26/2022 TECHNIQUE: Routine. FINDINGS: Single intrauterine gestation, longitudinal lie and cephalic presentation. Placenta is located anterior fundal, without previa. Amniotic fluid is normal, MVP 6.3 cm. Uterus is normal. Maternal adnexa (right and left ovaries) show no abnormalities. FETAL ANOMALY SCREEN: Survey of the fetal anatomy is normal. Specifically, normal posterior fossa, lateral ventricles, spine, stomach, bladder, kidneys, cord insertion, three-vessel cord, four-chamber heart, outflow tracts, extremities, face, and diaphragm.  BIOMETRY: Biparietal Diameter: 7.8 cm, 31 weeks 4 days Head Circumference: 28.6 cm, 31 weeks 4 days Abdominal Circumference: 26.0 cm, 30 weeks 1 day Femur Length: 6.2 cm, 32 weeks 1 day Estimated Fetal Weight: 1680 g Fetal Heart Rate: 146 bpm Cervical Length: 5.3  cm, no funneling EDC by This US exam: 08/04/2023 Composite Age by This US: 31 weeks 3 days     IMPRESSION:  1.  Single living intrauterine gestation. 2.  Based on current study, composite age of 31 weeks 3 days with EDC 08/04/2023. 3.  Normal fetal survey.      A total of 30 minutes was spent today on this visit including face to face conversation with the patient, EMR review (labs, imaging studies, pathology reports and outside records), counseling and care co-ordination and documentation.    Signed by: Percy Vasques MD

## 2023-06-14 NOTE — PROGRESS NOTES
Mauro is here with Jeb for her routine prenatal appt at 32w 5d gestation. Reviewed plan for today with one hour glucose and Hgb. Declines Tdap today. Feeling baby move, denies contractions.Has been very tired lately, reviewed last Hgb results.   Reviewed recommendation for daily iron supplementation for all pregnant women. Has not been taking a supplement, only using gummie prenatal vitamins. Rx written for iron, vitamin C and B12 sent to pharmacy today. Has appt today with hematology to review labs and recommend follow up.  Has had back pain and low abd pressure discussed maternity support belt and Rx written. Reviewed how to reach CNM with non-urgent and urgent concerns between visits. Advised to make appt in 2 weeks.

## 2023-06-14 NOTE — PATIENT INSTRUCTIONS
"\"We hope you had a positive experience and that you can definitely recommend [facility] to your family and friends. You ll be receiving a survey soon and we look forward to hearing your feedback\".    [facility]  - Contact information:  Appointment line and to get a hold of CNM in clinic Monday-Friday 8 am - 5 pm:  [phone].  There are some clinics with early start times (1st appointment 7:40 am) and others with evening hours (last appointment 6:20 pm).  Most are typically open from 8 am to 5 pm.    CNM on call answering service: [phone].  Specify your hospital of choice and leave a brief message for CNM;  will then page CNM who is on call at your specified hospital and you should receive a call back with 15 minutes.  Be sure that your ringer is audible and that you can accept blocked calls so that we can get back in touch with you! This number should be reserved for urgent needs if during the day, before 8 am, after 5 pm, weekends, holidays.    Contact the on-call CNM with warning signs, such as:  vaginal bleeding   Vaginal discharge and itching or pain and burning during urination  Leg/calf pain or swelling on one side  severe abdominal pain  nausea and vomiting more than 4-5 times a day, or if you are unable to keep anything down  fever more than 100.4 degrees F.     H3 PolÃ­meroshart  After each of your visits you are welcome to check [employer] for your visit summary including education and links to information relevant to your pregnancy and/or well woman care.   Find the \"Visits\" tab at the top of the page, you will see a list of recent visits and for each visit a for link for \"View After Visit Summary.\" View of your After Visit Summary will allow you to read our recommendations from your visit, review any education provided, and link to websites with useful information.   If you have any questions or difficulty navigating [place], please feel free to " "contact us and we will do our best to direct you.  Meet the Midwives from [facility]  You are invited to an informational meet and greet with [facility] Certified Nurse-Midwives. Our free \"Meet the Midwives\" event is a great opportunity to learn about our midwives' philosophy and experience, the hospitals where we can assist with your birth, and answer questions you may have. Partners, friends, and family are welcome to attend. Currently, this is a virtual event.  Date  Last Thursday of every month at 7 pm.    Link to next (live) meeting  [email or web address]  To Join by Telephone (audio only) Call:   [phone] Phone Conference ID: [phone] #      Touring the Maternity Care Center  At this time we are offering a virtual tour of the Maternity Care Centers at both [facility] and [facility]:   [facility] Maternity Care:   https://[facility].spigit/locations/the-birthplace-at--[facility] Maternity Care:   [email or web address].spigit/locations/[facility]    Scroll to the bottom of the page in this link if the above link does not work.      Breastfeeding and Birth Control  How do I decide what birth control method is best for me while I am breastfeeding?  Choosing a method of birth control is very personal. First, answer the following questions:     Do you want to have more children?   How much spacing between births do you want for your children?   Do you smoke or have you had any health problems, such as liver disease or a blood clot?  Talk about the answers to each of these questions with your health care provider to help you choose the best method for you.    Can I use breastfeeding as my birth control?  Using breastfeeding as your birth control (the lactational amenorrhea method) can be a good way to keep from getting pregnant in the first " months after the baby is born. Each time your baby nurses, your body releases a hormone called prolactin, which stops your body from making the hormones that cause you to ovulate (release an egg). If you are not ovulating, you cannot get pregnant.    The lactational amenorrhea method works only if:   you have not started your period yet.   you are breastfeeding only and not giving your baby any other food or drink.   you are breastfeeding at least every 4 hours during the day and every 6 hours at night.   your baby is less than 6 months old.  When any 1 of these 4 things is not happening, you no longer have good protection from getting pregnant, and you should use another form of birth control.    What birth control methods are safe for me to use while I breastfeed?    Methods without hormones  Methods without hormones do not affect you, your baby, or your breastfeeding.  Methods without hormones that are the most effective   The copper intrauterine contraceptive device (IUD) (ParaGard) is a small, T-shaped device that is in- serted into your uterus (womb) through the vagina and cervix. The copper IUD lasts for 10 years.   Sterilization (getting your tubes tied or your partner having a vasectomy) is very effective, but it is per- manent. You should choose sterilization only if you do not want to have more children.  A method without hormones that is effective   The lactational amenorrhea method described above is effective for the first 6 months.  Methods without hormones that are less effective   Natural family planning is monitoring your body for signs of ovulation and not having sex when you think you are ovulating. This method is reliable only if you are having regular periods every month.   Barrier methods (condoms, diaphragms, sponges, and spermicides) are used at the time you have sex. These methods are effective only if you use them correctly every time.    Methods with hormones  Birth control methods that  use hormones can be used while you are breastfeeding. They may have a small effect on lowering the amount of milk you make. All hormones will get into your breast milk in very small amounts, but there is no known harm to your baby from this small amount of hormone in breast milk.only methodsmethods use only 1 hormone, called progestin. You can start them right after your baby is born or wait 4 to 6 weeks to make sure your milk supply is good.   Progestin-only pills ( minipills ): If you like to take pills every day, you can use the minipill. In order forpill to work well, you have to take 1 at the same time each day. When you stop breastfeeding, you should start pills that have both estrogen and progestin because they are better at keeping you from get- ting pregnant.   Progestin IUD (Mirena): The progestin IUD is shaped and inserted into the uterus like the copper IUD. It works for up to 5 years. Both IUDs are usually inserted 4 to 6 weeks after the baby is born.  Progestin implant (Nexplanon): The progestin implant is a small matchstick-sized flexible kasie. It is placed into the fatty tissue in the back of your arm. It works for up to 3 years.  Progestin shot (Depo-Provera): The progestin shot is given every 3 months.    estrogen and progestin methods  These methods use 2 hormones, called estrogen and progestin.     These methods increase your risk of a blood clot, which is already higher than normal after you have a baby. You should not use them until your baby is at least 6 weeks old. The combined methods are not recommended as the first choice for women who are breastfeeding. If a combined method is the one that you feel will be best for you to prevent getting pregnant, these methods are okay to use while breastfeeding.   Combined birth control pills: You take a pill each day.  Vaginal ring (NuvaRing): The ring is worn in the vagina for 3 weeks then left out for 1 week before youin a new ring.  Patch (Ortho  Frankie): The patch is placed on your skin and changed every week for 3 weeks then left off forweek before putting a new patch on a different area of your skin.    Pediatric Care Providers at [facility]:    Choosing the right provider is one of the most important decisions you ll make about your health care. We can help you find the right one. Remember, you re looking for a provider you can trust and work with to improve your health and well-being, so take time to think about what you need. Depending on how complicated your health care needs are, you may need to see more than one type of provider.    Primary Care Providers: You ll see a primary care provider first for most health issues. They ll work with you to get your recommended screenings, help you manage chronic conditions, and refer you to other types of providers if you need them. Your primary care provider may be called a family physician or doctor, internist, general practitioner, nurse practitioner, or physician s assistant. Your child or teenager s provider may be called a pediatrician.  Specialists: You ll see a specialist for certain services or to treat specific conditions. Specialists include cardiologists, oncologists, psychologists, allergists, podiatrists, and orthopedists. You may need a referral from your primary care provider before you go to a specialist in order for your health plan to pay for your visit.\[place]ra[place]re are some tips for finding a provider where you live:  If you already have a provider you like and want to keep working with, call their office and ask if they accept your coverage.  Call your insurance company or state Medicaid and CHIP program. Look at their website or check your member handbook to find providers in your network who take your health coverage.  Ask your friends or family if they have providers they like and use these tools to compare health care providers in your area.    Family Medicine at  [facility]:      https://www.[place].org/specialties/family-medicine    Many of our families enjoy all seeing the same doctor, who comes to know the whole family very well. We base our practice on the knowledge of the patient in the context of family and community.  WHY CHOOSE A FAMILY MEDICINE PHYSICIAN?  Ability of the whole family to see the same doctor  Focus on the whole person, including physical and emotional health  A personal relationship with their doctor that is nurtured over time  Respect for individual and family beliefs and values  No need to change primary care providers when a certain age is reached  Coordination of care when other health care services are needed    Pediatrics at  [facility]:   https://www.[place].org/specialties/pediatric-care    Through a teaching affiliation with the [facility], [facility] staff keeps current on new developments in the field of pediatrics.     Everything we do centers around caring for children. We place special emphasis on wellness and prevention.pediatric care team includes a team of pediatricians and certified nurse practitioners who provide care to pediatric and adolescent patients ages 0 to 18, and some up to the age of 26. We offer preventive health maintenance for healthy children as well the diagnosis and treatment of common and chronic illnesses and injuries. In addition, we also offer several pediatric specialists who focus on adolescent health issues and developmental and behavioral issues.    Circumcision    Educational video:     [email or web address]    For More Information  American Academy of Family Physicians: Circumcision  http://familydoctor.org/familydoctor/en/pregnancy-newborns/caring-for-newborns/infant- care/circumcision.html  MedlinePlus: Circumcision (includes a slide show on the procedure)  www.nlm.nih.gov/medlineplus/circumcision.html  American Academy of Pediatrics:  Policy statement on circumcision  Http://pediatrics.aappublications.org/content/130/3/e756.abstract      Childbirth and Parenting Education:     Everyday Miracles:   https://www.everyday-miracles.org/    Free Video Series from [facility]: https://nursing.[facility]/academics/specialty-areas/nurse-midwifery/having-baby-prenatal-videos/having-baby-prenatal-and    Childbirth Education virtual (live) classes: www.Smart Balloon/classes  The Birth Hour: https://[facility]/online-childbirth-class/  BirthED: https://www.birthedmn.com/  HARSHA parenting center: http://naaya/   (306) 990-YWLH  Blooma: (education, yoga & wellness) www.[employer]  Enlightened Mama: www.enlightenedmama.Harvest Trends   Childbirth collective: (Parent topic nights)  www.childbirthcollective.org/  Hypnobabies:  www.hypnobabiMarley Spoon.Harvest Trends/  Hypnobirthing:  Http://igadget.asia.Harvest Trends/  Hypnobirthing virtual class: www.[employer]/hypnobirthing    Information about doulas:  Childbirth collective: http://www.childbirthcollective.org/  Doulas of [place] (PATRICK):  www.patrick.org  [facility]  project: http://twincitiesdoulaproject.com/     Early Childhood and Family Education (ECFE):  ECFE offers parents hands-on learning experiences that will nourish a lifetime of teachable moments.  http://ecfe.info/ecfe-home/    APPS and Podcasts:   Lance Rivera Nurture    Evidence Based Birth  The Birth Hour (for birth stories)   Birthful   Expectful   The Longest Shortest Time  PregnancyPodcjoann Ramirez    Book Recommendations:   Deandra Catrachito's Birthing From Within--first few chapters include a new-age tone, you may prefer to skip it and keep going, because there is good stuff later.  This book recommendation covers emotional preparation, but does cover coping with pain, and use of both pharmacological and nonpharmacological methods.    Guide to Childbirth by Auburn May Pop  Childbirth Without Fear by Nuha Bowen  "Read    Dr. Hylton' The Pregnancy Book and The Birth Book--the pregnancy book goes month-by month    The Birth Partner by Nakia Zayas    Womanly Art of Breastfeeding by La Leche League International   Bestfeeding by Hattie Bailey--great pictures    Mothering Your Nursing Toddler, by Natalie Aragon.   Addresses dealing with so many of the challenging behaviors of a nursing toddler.  How Weaning Happens, by La Leche League.  Discusses weaning at all ages, from medically necessary weaning of an infant, all the way up to age 5 (or older), with why/why not, and strategies.  Very empowering book both for deciding to wean and deciding not to.    American College of Nurse-Midwives (ACNM) http://www.midwife.org/; look at the informational handouts at http://www.midwife.org/Share-With-Women     www.mymidwife.org    Mother to Baby (Medication and Herbal guidance in pregnancy): http://www.mothertobaby.org  Toll-Free Hotline: 165.503.2011  LactMed (Medication use while breastfeeding): http://toxnet.nlm.nih.gov/newtoxnet/lactmed.htm    Women's Health.gov:  http://www.womenshealth.gov/a-z-topics/index.html    American pregnancy association - http://americanpregnancy.org    Centering Pregnancy (group prenatal care option): http://centeringhealthcare.org    March of Dimes www.eMindful.com     FDA - Nutrition  www.mypyramid.gov  Under \"For Consumers,\" click on \"pregnant and breastfeeding women.\"      Centers for Disease Control and Prevention (CDC) - Vaccines : http://www.cdc.gov/vaccines/       When researching information on the web, question the validity of websites.  The domains .gov, .edu and.org tend to be more reliable information.  If there are a lot of advertisements, be cautious of the information provided. Stay away from blogs and chat rooms please!     Virtual Breastfeeding Support:    During this time of isolation, breastfeeding families need even more community!  Here are some area organizations offering " "virtual support groups for breastfeeding:    [facility] Cafe Support Group,  at 10:30 am   Run by ZOEY Torres of [employer]   Go to [employer] Facebook page and click on \"events\" for link   https://www.Cylande.[email or web address]/  [facility] Milk Hour,  at 2:30 pm    Run by ZOEY Lance   Go to [facility] FB page and send message to get link   https://www.Cylande.com/[facility] Generalfoundations/  [facility] holding virtual meetings the first Tuesday of each month, 8-9 pm, and the   Third Saturday, 10 - 11 am.  Go to [facility] [place] FB page; message to get link [email or web address]://www.[place].Gemvara.com/LLLofGoldenKeith/?hc_location=[facility] offers a Lactation Lounge every Friday 12pm - 1pm, run by Taylor Cuevas [facility] Leader   Sign up via link at https://www.YouBeauty/cbe-lactation  [facility] is offering virtual support groups every Monday, 10:30 am - 12 pm, run by nurse IBCLC   Https://[email or web address]/    Prenatal Breastfeeding Classes:      [facility] is offering virtual breastfeeding and  care classes:  https://www.YouBeauty/education-workshops  BirthEd childbirth and breastfeeding education offering virtual prenatal breastfeeding classes  https://www.birthedmn.com/workshops    Breastfeeding clinic visits are at [facility] on , [facility] on  and [facility] on . Call to schedule, [phone].    New Parent Connection:   Rimma Noe, [address] [place] [facility]  In-person meetings on  from 6 pm - 7:15 pm for parents of  to 9 months, at the same site.   All are free, drop-in, no registration required.    There are also free virtual meetings ongoing on :  11:30 am - 12:30 pm for parents of " newborns to 3 months  4:15 pm to 5:15 pm for parents of 3 to 9-month olds  For joining info parents should call Evelia Dempsey at [phone]

## 2023-06-15 LAB
C TRACH DNA SPEC QL PROBE+SIG AMP: NEGATIVE
DEPRECATED CALCIDIOL+CALCIFEROL SERPL-MC: 31 UG/L (ref 20–75)
N GONORRHOEA DNA SPEC QL NAA+PROBE: NEGATIVE
T PALLIDUM AB SER QL: NONREACTIVE

## 2023-06-22 DIAGNOSIS — D50.8 OTHER IRON DEFICIENCY ANEMIA: Primary | ICD-10-CM

## 2023-06-29 ENCOUNTER — INFUSION THERAPY VISIT (OUTPATIENT)
Dept: INFUSION THERAPY | Facility: HOSPITAL | Age: 38
End: 2023-06-29
Attending: INTERNAL MEDICINE
Payer: COMMERCIAL

## 2023-06-29 VITALS
TEMPERATURE: 98.4 F | RESPIRATION RATE: 18 BRPM | SYSTOLIC BLOOD PRESSURE: 115 MMHG | OXYGEN SATURATION: 98 % | HEART RATE: 97 BPM | DIASTOLIC BLOOD PRESSURE: 81 MMHG

## 2023-06-29 DIAGNOSIS — D50.8 OTHER IRON DEFICIENCY ANEMIA: Primary | ICD-10-CM

## 2023-06-29 PROCEDURE — 96366 THER/PROPH/DIAG IV INF ADDON: CPT

## 2023-06-29 PROCEDURE — 96365 THER/PROPH/DIAG IV INF INIT: CPT

## 2023-06-29 PROCEDURE — 250N000011 HC RX IP 250 OP 636: Mod: JZ | Performed by: INTERNAL MEDICINE

## 2023-06-29 PROCEDURE — 258N000003 HC RX IP 258 OP 636: Performed by: INTERNAL MEDICINE

## 2023-06-29 RX ORDER — METHYLPREDNISOLONE SODIUM SUCCINATE 125 MG/2ML
125 INJECTION, POWDER, LYOPHILIZED, FOR SOLUTION INTRAMUSCULAR; INTRAVENOUS
Status: DISCONTINUED | OUTPATIENT
Start: 2023-06-29 | End: 2023-06-29 | Stop reason: HOSPADM

## 2023-06-29 RX ORDER — DIPHENHYDRAMINE HYDROCHLORIDE 50 MG/ML
50 INJECTION INTRAMUSCULAR; INTRAVENOUS
Status: CANCELLED
Start: 2023-07-01

## 2023-06-29 RX ORDER — ALBUTEROL SULFATE 0.83 MG/ML
2.5 SOLUTION RESPIRATORY (INHALATION)
Status: DISCONTINUED | OUTPATIENT
Start: 2023-06-29 | End: 2023-06-29 | Stop reason: HOSPADM

## 2023-06-29 RX ORDER — ALBUTEROL SULFATE 90 UG/1
1-2 AEROSOL, METERED RESPIRATORY (INHALATION)
Status: CANCELLED
Start: 2023-07-01

## 2023-06-29 RX ORDER — DIPHENHYDRAMINE HYDROCHLORIDE 50 MG/ML
50 INJECTION INTRAMUSCULAR; INTRAVENOUS
Status: DISCONTINUED | OUTPATIENT
Start: 2023-06-29 | End: 2023-06-29 | Stop reason: HOSPADM

## 2023-06-29 RX ORDER — METHYLPREDNISOLONE SODIUM SUCCINATE 125 MG/2ML
125 INJECTION, POWDER, LYOPHILIZED, FOR SOLUTION INTRAMUSCULAR; INTRAVENOUS
Status: CANCELLED
Start: 2023-07-01

## 2023-06-29 RX ORDER — ALBUTEROL SULFATE 90 UG/1
1-2 AEROSOL, METERED RESPIRATORY (INHALATION)
Status: DISCONTINUED | OUTPATIENT
Start: 2023-06-29 | End: 2023-06-29 | Stop reason: HOSPADM

## 2023-06-29 RX ORDER — HEPARIN SODIUM,PORCINE 10 UNIT/ML
5-20 VIAL (ML) INTRAVENOUS DAILY PRN
Status: CANCELLED | OUTPATIENT
Start: 2023-07-01

## 2023-06-29 RX ORDER — EPINEPHRINE 1 MG/ML
0.3 INJECTION, SOLUTION INTRAMUSCULAR; SUBCUTANEOUS EVERY 5 MIN PRN
Status: CANCELLED | OUTPATIENT
Start: 2023-07-01

## 2023-06-29 RX ORDER — EPINEPHRINE 1 MG/ML
0.3 INJECTION, SOLUTION INTRAMUSCULAR; SUBCUTANEOUS EVERY 5 MIN PRN
Status: DISCONTINUED | OUTPATIENT
Start: 2023-06-29 | End: 2023-06-29 | Stop reason: HOSPADM

## 2023-06-29 RX ORDER — HEPARIN SODIUM (PORCINE) LOCK FLUSH IV SOLN 100 UNIT/ML 100 UNIT/ML
5 SOLUTION INTRAVENOUS
Status: CANCELLED | OUTPATIENT
Start: 2023-07-01

## 2023-06-29 RX ORDER — ALBUTEROL SULFATE 0.83 MG/ML
2.5 SOLUTION RESPIRATORY (INHALATION)
Status: CANCELLED | OUTPATIENT
Start: 2023-07-01

## 2023-06-29 RX ORDER — MEPERIDINE HYDROCHLORIDE 25 MG/ML
25 INJECTION INTRAMUSCULAR; INTRAVENOUS; SUBCUTANEOUS EVERY 30 MIN PRN
Status: CANCELLED | OUTPATIENT
Start: 2023-07-01

## 2023-06-29 RX ORDER — MEPERIDINE HYDROCHLORIDE 25 MG/ML
25 INJECTION INTRAMUSCULAR; INTRAVENOUS; SUBCUTANEOUS EVERY 30 MIN PRN
Status: DISCONTINUED | OUTPATIENT
Start: 2023-06-29 | End: 2023-06-29 | Stop reason: HOSPADM

## 2023-06-29 RX ADMIN — SODIUM CHLORIDE 250 ML: 9 INJECTION, SOLUTION INTRAVENOUS at 13:18

## 2023-06-29 RX ADMIN — IRON SUCROSE 300 MG: 20 INJECTION, SOLUTION INTRAVENOUS at 13:21

## 2023-06-29 ASSESSMENT — PAIN SCALES - GENERAL: PAINLEVEL: NO PAIN (0)

## 2023-06-29 NOTE — PROGRESS NOTES
Infusion Nursing Note:  Mauro Irizarry presents today for 2/3 Venofer    Patient seen by provider today: No   present during visit today: Not Applicable.    Note: Mauro arrived into the infusion center accompanied by her  in a stable condition, VSS. Ppan of care reviewed, this is her first time of receiving Iron Infusion. Education and signs of Iron infusion reaction reviewed, she verbalized understanding of her plan of care and return to clinic.    Intravenous Access:  Peripheral IV placed.    Treatment Conditions:  Not Applicable.    Post Infusion Assessment:  Patient tolerated infusion without incident.  Patient observed for 30  minutes post Iron Infusion per protocol.  Site patent and intact, free from redness, edema or discomfort.  No evidence of extravasations.  Access discontinued per protocol.     Discharge Plan:   Discharge instructions reviewed with: Patient.  Patient and/or family verbalized understanding of discharge instructions and all questions answered.  Patient discharged in stable condition accompanied by: .  Departure Mode: Ambulatory.    Sherry Barillas RN

## 2023-07-10 ENCOUNTER — INFUSION THERAPY VISIT (OUTPATIENT)
Dept: INFUSION THERAPY | Facility: HOSPITAL | Age: 38
End: 2023-07-10
Payer: COMMERCIAL

## 2023-07-10 VITALS
TEMPERATURE: 97.9 F | DIASTOLIC BLOOD PRESSURE: 59 MMHG | RESPIRATION RATE: 20 BRPM | SYSTOLIC BLOOD PRESSURE: 111 MMHG | HEART RATE: 85 BPM | OXYGEN SATURATION: 98 %

## 2023-07-10 DIAGNOSIS — D50.8 OTHER IRON DEFICIENCY ANEMIA: Primary | ICD-10-CM

## 2023-07-10 PROCEDURE — 258N000003 HC RX IP 258 OP 636: Performed by: INTERNAL MEDICINE

## 2023-07-10 PROCEDURE — 96366 THER/PROPH/DIAG IV INF ADDON: CPT

## 2023-07-10 PROCEDURE — 250N000011 HC RX IP 250 OP 636: Mod: JZ | Performed by: INTERNAL MEDICINE

## 2023-07-10 PROCEDURE — 96365 THER/PROPH/DIAG IV INF INIT: CPT

## 2023-07-10 RX ORDER — EPINEPHRINE 1 MG/ML
0.3 INJECTION, SOLUTION INTRAMUSCULAR; SUBCUTANEOUS EVERY 5 MIN PRN
Status: CANCELLED | OUTPATIENT
Start: 2023-07-11

## 2023-07-10 RX ORDER — DIPHENHYDRAMINE HYDROCHLORIDE 50 MG/ML
50 INJECTION INTRAMUSCULAR; INTRAVENOUS
Status: DISCONTINUED | OUTPATIENT
Start: 2023-07-10 | End: 2023-07-10 | Stop reason: HOSPADM

## 2023-07-10 RX ORDER — METHYLPREDNISOLONE SODIUM SUCCINATE 125 MG/2ML
125 INJECTION, POWDER, LYOPHILIZED, FOR SOLUTION INTRAMUSCULAR; INTRAVENOUS
Status: DISCONTINUED | OUTPATIENT
Start: 2023-07-10 | End: 2023-07-10 | Stop reason: HOSPADM

## 2023-07-10 RX ORDER — ALBUTEROL SULFATE 90 UG/1
1-2 AEROSOL, METERED RESPIRATORY (INHALATION)
Status: DISCONTINUED | OUTPATIENT
Start: 2023-07-10 | End: 2023-07-10 | Stop reason: HOSPADM

## 2023-07-10 RX ORDER — EPINEPHRINE 1 MG/ML
0.3 INJECTION, SOLUTION INTRAMUSCULAR; SUBCUTANEOUS EVERY 5 MIN PRN
Status: DISCONTINUED | OUTPATIENT
Start: 2023-07-10 | End: 2023-07-10 | Stop reason: HOSPADM

## 2023-07-10 RX ORDER — METHYLPREDNISOLONE SODIUM SUCCINATE 125 MG/2ML
125 INJECTION, POWDER, LYOPHILIZED, FOR SOLUTION INTRAMUSCULAR; INTRAVENOUS
Status: CANCELLED
Start: 2023-07-11

## 2023-07-10 RX ORDER — ALBUTEROL SULFATE 90 UG/1
1-2 AEROSOL, METERED RESPIRATORY (INHALATION)
Status: CANCELLED
Start: 2023-07-11

## 2023-07-10 RX ORDER — MEPERIDINE HYDROCHLORIDE 25 MG/ML
25 INJECTION INTRAMUSCULAR; INTRAVENOUS; SUBCUTANEOUS EVERY 30 MIN PRN
Status: CANCELLED | OUTPATIENT
Start: 2023-07-11

## 2023-07-10 RX ORDER — HEPARIN SODIUM,PORCINE 10 UNIT/ML
5-20 VIAL (ML) INTRAVENOUS DAILY PRN
Status: CANCELLED | OUTPATIENT
Start: 2023-07-11

## 2023-07-10 RX ORDER — ALBUTEROL SULFATE 0.83 MG/ML
2.5 SOLUTION RESPIRATORY (INHALATION)
Status: DISCONTINUED | OUTPATIENT
Start: 2023-07-10 | End: 2023-07-10 | Stop reason: HOSPADM

## 2023-07-10 RX ORDER — ALBUTEROL SULFATE 0.83 MG/ML
2.5 SOLUTION RESPIRATORY (INHALATION)
Status: CANCELLED | OUTPATIENT
Start: 2023-07-11

## 2023-07-10 RX ORDER — MEPERIDINE HYDROCHLORIDE 25 MG/ML
25 INJECTION INTRAMUSCULAR; INTRAVENOUS; SUBCUTANEOUS EVERY 30 MIN PRN
Status: DISCONTINUED | OUTPATIENT
Start: 2023-07-10 | End: 2023-07-10 | Stop reason: HOSPADM

## 2023-07-10 RX ORDER — DIPHENHYDRAMINE HYDROCHLORIDE 50 MG/ML
50 INJECTION INTRAMUSCULAR; INTRAVENOUS
Status: CANCELLED
Start: 2023-07-11

## 2023-07-10 RX ORDER — HEPARIN SODIUM (PORCINE) LOCK FLUSH IV SOLN 100 UNIT/ML 100 UNIT/ML
5 SOLUTION INTRAVENOUS
Status: CANCELLED | OUTPATIENT
Start: 2023-07-11

## 2023-07-10 RX ADMIN — IRON SUCROSE 300 MG: 20 INJECTION, SOLUTION INTRAVENOUS at 13:11

## 2023-07-10 RX ADMIN — SODIUM CHLORIDE 250 ML: 9 INJECTION, SOLUTION INTRAVENOUS at 13:03

## 2023-07-10 ASSESSMENT — PAIN SCALES - GENERAL: PAINLEVEL: NO PAIN (0)

## 2023-07-13 ENCOUNTER — INFUSION THERAPY VISIT (OUTPATIENT)
Dept: INFUSION THERAPY | Facility: HOSPITAL | Age: 38
End: 2023-07-13
Attending: MIDWIFE
Payer: COMMERCIAL

## 2023-07-13 VITALS
OXYGEN SATURATION: 99 % | HEART RATE: 89 BPM | DIASTOLIC BLOOD PRESSURE: 58 MMHG | RESPIRATION RATE: 16 BRPM | SYSTOLIC BLOOD PRESSURE: 104 MMHG | TEMPERATURE: 98 F

## 2023-07-13 DIAGNOSIS — D50.8 OTHER IRON DEFICIENCY ANEMIA: Primary | ICD-10-CM

## 2023-07-13 PROCEDURE — 96365 THER/PROPH/DIAG IV INF INIT: CPT

## 2023-07-13 PROCEDURE — 258N000003 HC RX IP 258 OP 636: Performed by: INTERNAL MEDICINE

## 2023-07-13 PROCEDURE — 250N000011 HC RX IP 250 OP 636: Mod: JZ | Performed by: INTERNAL MEDICINE

## 2023-07-13 PROCEDURE — 96366 THER/PROPH/DIAG IV INF ADDON: CPT

## 2023-07-13 RX ORDER — METHYLPREDNISOLONE SODIUM SUCCINATE 125 MG/2ML
125 INJECTION, POWDER, LYOPHILIZED, FOR SOLUTION INTRAMUSCULAR; INTRAVENOUS
Start: 2023-07-14

## 2023-07-13 RX ORDER — HEPARIN SODIUM,PORCINE 10 UNIT/ML
5-20 VIAL (ML) INTRAVENOUS DAILY PRN
OUTPATIENT
Start: 2023-07-14

## 2023-07-13 RX ORDER — MEPERIDINE HYDROCHLORIDE 25 MG/ML
25 INJECTION INTRAMUSCULAR; INTRAVENOUS; SUBCUTANEOUS EVERY 30 MIN PRN
Status: DISCONTINUED | OUTPATIENT
Start: 2023-07-13 | End: 2023-07-13 | Stop reason: HOSPADM

## 2023-07-13 RX ORDER — ALBUTEROL SULFATE 0.83 MG/ML
2.5 SOLUTION RESPIRATORY (INHALATION)
OUTPATIENT
Start: 2023-07-14

## 2023-07-13 RX ORDER — DIPHENHYDRAMINE HYDROCHLORIDE 50 MG/ML
50 INJECTION INTRAMUSCULAR; INTRAVENOUS
Status: DISCONTINUED | OUTPATIENT
Start: 2023-07-13 | End: 2023-07-13 | Stop reason: HOSPADM

## 2023-07-13 RX ORDER — ALBUTEROL SULFATE 90 UG/1
1-2 AEROSOL, METERED RESPIRATORY (INHALATION)
Status: DISCONTINUED | OUTPATIENT
Start: 2023-07-13 | End: 2023-07-13 | Stop reason: HOSPADM

## 2023-07-13 RX ORDER — EPINEPHRINE 1 MG/ML
0.3 INJECTION, SOLUTION INTRAMUSCULAR; SUBCUTANEOUS EVERY 5 MIN PRN
OUTPATIENT
Start: 2023-07-14

## 2023-07-13 RX ORDER — ALBUTEROL SULFATE 90 UG/1
1-2 AEROSOL, METERED RESPIRATORY (INHALATION)
Start: 2023-07-14

## 2023-07-13 RX ORDER — EPINEPHRINE 1 MG/ML
0.3 INJECTION, SOLUTION INTRAMUSCULAR; SUBCUTANEOUS EVERY 5 MIN PRN
Status: DISCONTINUED | OUTPATIENT
Start: 2023-07-13 | End: 2023-07-13 | Stop reason: HOSPADM

## 2023-07-13 RX ORDER — DIPHENHYDRAMINE HYDROCHLORIDE 50 MG/ML
50 INJECTION INTRAMUSCULAR; INTRAVENOUS
Start: 2023-07-14

## 2023-07-13 RX ORDER — METHYLPREDNISOLONE SODIUM SUCCINATE 125 MG/2ML
125 INJECTION, POWDER, LYOPHILIZED, FOR SOLUTION INTRAMUSCULAR; INTRAVENOUS
Status: DISCONTINUED | OUTPATIENT
Start: 2023-07-13 | End: 2023-07-13 | Stop reason: HOSPADM

## 2023-07-13 RX ORDER — ALBUTEROL SULFATE 0.83 MG/ML
2.5 SOLUTION RESPIRATORY (INHALATION)
Status: DISCONTINUED | OUTPATIENT
Start: 2023-07-13 | End: 2023-07-13 | Stop reason: HOSPADM

## 2023-07-13 RX ORDER — HEPARIN SODIUM (PORCINE) LOCK FLUSH IV SOLN 100 UNIT/ML 100 UNIT/ML
5 SOLUTION INTRAVENOUS
OUTPATIENT
Start: 2023-07-14

## 2023-07-13 RX ORDER — MEPERIDINE HYDROCHLORIDE 25 MG/ML
25 INJECTION INTRAMUSCULAR; INTRAVENOUS; SUBCUTANEOUS EVERY 30 MIN PRN
OUTPATIENT
Start: 2023-07-14

## 2023-07-13 RX ADMIN — SODIUM CHLORIDE 250 ML: 9 INJECTION, SOLUTION INTRAVENOUS at 13:57

## 2023-07-13 RX ADMIN — IRON SUCROSE 300 MG: 20 INJECTION, SOLUTION INTRAVENOUS at 14:12

## 2023-07-13 NOTE — PROGRESS NOTES
Infusion Nursing Note:  Mauro Irizarry presents today for 2/3 Venofer    Patient seen by provider today: No   present during visit today: Not Applicable.    Note: Mauro arrived into the infusion center accompanied by her  in a stable condition, VSS. Plan of care reviewed,  she verbalized understanding of her plan of care and return to clinic.She tolerated previous doses well and acknowledged she  is feeling better and not as tired as she was before the Iron infusion.    Intravenous Access:  Peripheral IV placed.    Treatment Conditions:  JAYLYN    Post Infusion Assessment:  Patient tolerated infusion without incident.  Patient observed post Iron Infusion and was stable.  Site patent and intact, free from redness, edema or discomfort.  Access discontinued per protocol.     Discharge Plan:   Discharge instructions reviewed with: Patient.  Patient and/or family verbalized understanding of discharge instructions and all questions answered.  Patient discharged in stable condition accompanied by: .  Departure Mode: Ambulatory.    Sonia Ochoa RN

## 2023-07-15 ENCOUNTER — TELEPHONE (OUTPATIENT)
Dept: MIDWIFE SERVICES | Facility: CLINIC | Age: 38
End: 2023-07-15
Payer: COMMERCIAL

## 2023-07-15 NOTE — TELEPHONE ENCOUNTER
"Pt called CNM on call.  Having some brown discharge when she wipes in BR.  Did put on a pad \"just in case\" but it is not getting onto her pad at this point.  Denies gloria bleeding.  Fetal movements have been normal last night, has not yet paid attention this AM.  Denies contractions, notes some back pain that is not uncommon for her.  Wondering if labor may be starting.  Declines evaluation at the hospital now.  Will continue to monitor.  Asking about going to [facility] \"if needed\" reports her children will have to be dropped off at her mom's in [place] so it may be easier logistically to go to [facility] for the birth.  Encouraged dropping off her children now in case labor does progress.  Reiterated that in an emergency situation it is ok to present to the nearest hospital, but if routine labor it's best to present to the facility at which she has been receiving prenatal care.  Elainela will monitor baby's movements over the next hour and call if they are not normal.  Reviewed labor precautions.  Encouraged to call with questions/conerns prn.  "

## 2023-07-19 ENCOUNTER — LAB (OUTPATIENT)
Dept: LAB | Facility: CLINIC | Age: 38
End: 2023-07-19
Payer: COMMERCIAL

## 2023-07-19 ENCOUNTER — PATIENT OUTREACH (OUTPATIENT)
Dept: ONCOLOGY | Facility: HOSPITAL | Age: 38
End: 2023-07-19
Payer: COMMERCIAL

## 2023-07-19 ENCOUNTER — TELEPHONE (OUTPATIENT)
Dept: MIDWIFE SERVICES | Facility: CLINIC | Age: 38
End: 2023-07-19
Payer: COMMERCIAL

## 2023-07-19 DIAGNOSIS — O99.019 ANTEPARTUM ANEMIA: ICD-10-CM

## 2023-07-19 DIAGNOSIS — D50.8 OTHER IRON DEFICIENCY ANEMIA: ICD-10-CM

## 2023-07-19 DIAGNOSIS — D50.8 OTHER IRON DEFICIENCY ANEMIA: Primary | ICD-10-CM

## 2023-07-19 LAB
FERRITIN SERPL-MCNC: 216 NG/ML (ref 6–175)
HGB BLD-MCNC: 10.1 G/DL (ref 11.7–15.7)
IRON BINDING CAPACITY (ROCHE): 422 UG/DL (ref 240–430)
IRON SATN MFR SERPL: 20 % (ref 15–46)
IRON SERPL-MCNC: 83 UG/DL (ref 37–145)

## 2023-07-19 PROCEDURE — 83540 ASSAY OF IRON: CPT

## 2023-07-19 PROCEDURE — 83550 IRON BINDING TEST: CPT

## 2023-07-19 PROCEDURE — 36415 COLL VENOUS BLD VENIPUNCTURE: CPT

## 2023-07-19 PROCEDURE — 85018 HEMOGLOBIN: CPT

## 2023-07-19 PROCEDURE — 82728 ASSAY OF FERRITIN: CPT

## 2023-07-19 NOTE — CONFIDENTIAL NOTE
TC: spoke with Mauro,  37w5d, this morning who reports persistent spotting of light pink and sometimes brown discharge on the toilet paper, never on the pad in place, x 4 days. Never saturating the toilet paper or in the toilet. Notes normal FM and no other leaking fluid. Menstrual like cramping but no regular UCs. Last seen at 32 weeks, has been attending her IV infusion visits but has not made a midwife appointment. Advised to schedule for soonest able. Will need GBS swab. Advised if UCs become regular or intensity increases, or with increased vaginal bleeding to contact oncall CNM again. Reviewed other warning s/sx such as decreased fetal movement and reasons to call.

## 2023-07-20 ENCOUNTER — NURSE TRIAGE (OUTPATIENT)
Dept: NURSING | Facility: CLINIC | Age: 38
End: 2023-07-20
Payer: COMMERCIAL

## 2023-07-20 ENCOUNTER — TELEPHONE (OUTPATIENT)
Dept: MIDWIFE SERVICES | Facility: CLINIC | Age: 38
End: 2023-07-20
Payer: COMMERCIAL

## 2023-07-20 NOTE — TELEPHONE ENCOUNTER
"OB Triage Call    Is patient's OB/Midwife with the formerly [facility] or [facility]? [facility]- Is patient's primary OB a Midwife? Yes- At this time we do not triage for the [facility] midwives.  Paged on-call Midwife Jackie Wilkinson is the on-call per \"Ram[facility]ndosa\" at [phone] (paging line for [facility]wife group) at   to contact patient directly.      Conveyed MyChart message of 23 (yesterday) from midwife (Nancy Frank CNM) which pt has not yet read:  Nancy Frank CNM  to Mauro SOLIMAN Noor          23  9:15 PM  Mauro,  If you are still having these symptoms that you spoke to the nurse about earlier today please call the midwife on call to discuss follow up needed. [phone]. Nancy Frank CNM    Pt agrees to have on-call midwife paged now per [facility] Midwife protocols.  Warm-transferred pt to [phone] and reached page- (\"Ramóndosa\") who will page on-call (Jackie Wilkinson-midwife) to call pt directly.    Is patient's delivering hospital on divert? Does not apply due to Provider will contact patient to develop plan of care      37w6d    Estimated Date of Delivery: Aug 4, 2023        OB History    Para Term  AB Living   8 4 4 0 3 4   SAB IAB Ectopic Multiple Live Births   3 0 0 0 4      # Outcome Date GA Lbr Mal/2nd Weight Sex Delivery Anes PTL Lv   8 Current            7 2022     SAB      6 SAB  5w0d    SAB      5 Term 10/14/21 39w5d 04:50 / 00:16 3.43 kg (7 lb 9 oz) M Vag-Spont  N JAJA      Name: NOOR,BABYBOY NAJLA      Apgar1: 9  Apgar5: 9   4 Term 20 38w4d 03:55 / 00:03 3.037 kg (6 lb 11.1 oz) F Vag-Spont None N JAJA      Name: NOOR,BABYGIRL NAJLA      Apgar1: 8  Apgar5: 9   3 Term 19 40w2d / 00:17 3.65 kg (8 lb 0.8 oz) M Vag-Spont  N JAJA      Name: MARY ELLEN VELEZ      Apgar1: 9  Apgar5: 9   2 2018 4w0d          1 Term 18 38w1d 05:40 / 00:24 2.52 kg (5 lb 8.9 oz) M Vag-Spont  N JAJA      Name: Dalton      Apgar1: 9  Apgar5: 9       No results found " for: MELISSA Maldonado RN 07/20/23 2:39 PM  [facility] Nurse Advisor    Reason for Disposition    [1] Follow-up call from patient regarding patient's clinical status AND [2] information urgent     [facility]    Protocols used: PCP CALL - NO TRIAGE-A-

## 2023-07-20 NOTE — TELEPHONE ENCOUNTER
Back and low back cramping.  Has been spotting brown almost 5 days, only when wipe.  Last had IC one month ago. Feeling normal fetal movement.  Feeling some pain with some UC's, but fades away.  Feels them every day when cleaning or cooking, or when laying down.  Contractions last 2-3 minutes and goes away, and when getting up it contracts again, sekou with activity.  No leaking of fluid.  No vaginal bleeding.  Is currently 37 6/7 weeks.  Her last prenatal visit was 32 weeks, and has not been seen since.  Advised she is in need of GBS testing ASAP.  Advised appointment in clinic for follow up as soon as possible, either Friday or Monday.  If there are changes in symptoms, LOF, vaginal bleeding, advised to call CNM on call back immediately.  Patient states she will return for regular OB visit.

## 2023-07-23 ENCOUNTER — TELEPHONE (OUTPATIENT)
Dept: OBGYN | Facility: CLINIC | Age: 38
End: 2023-07-23
Payer: COMMERCIAL

## 2023-07-23 NOTE — TELEPHONE ENCOUNTER
"0922 received page from pt regarding bleeding    [id number] TC to Mauro. Reports she woke from sleep with low pelvic pain and wetness, went to bathroom and noted period-like bleeding. No fluid leaking, just small amount blood. Pain in low back \"like labor is coming\". Reports that pain comes and goes, and belly is soft in between. Endorses normal fetal movement. She is taking her children to her mother's in [place] and will contact the CNM in 30-60 minutes with an update. Advised to call earlier if bleeding increases or if pain constant/hardening of belly.     [id number].This writer did not get an update from pt so reached out. Mauro reports she continues to have mild intermittent pain and abdominal pressure; no pelvic pressure. Bleeding is small amount, \"like end of period\". No LOF; endorses normal fetal movement. Kids are still with her; planning to take them to her mom's and see if contractions continue. She has a routine OB visit tomorrow. Reviewed to page CNM if contractions increase in frequency/intensity, if bleeding heavier than a period, if LOF, or if decreased fetal movement.   EDWIGE Garcia CNM on 7/23/2023 at 11:52 AM    "

## 2023-07-24 ENCOUNTER — PRENATAL OFFICE VISIT (OUTPATIENT)
Dept: MIDWIFE SERVICES | Facility: CLINIC | Age: 38
End: 2023-07-24
Payer: COMMERCIAL

## 2023-07-24 VITALS
HEART RATE: 80 BPM | SYSTOLIC BLOOD PRESSURE: 106 MMHG | BODY MASS INDEX: 34.45 KG/M2 | WEIGHT: 201.8 LBS | DIASTOLIC BLOOD PRESSURE: 60 MMHG | HEIGHT: 64 IN

## 2023-07-24 DIAGNOSIS — O09.30 HISTORY OF INADEQUATE PRENATAL CARE: ICD-10-CM

## 2023-07-24 DIAGNOSIS — O09.529 ANTEPARTUM MULTIGRAVIDA OF ADVANCED MATERNAL AGE: ICD-10-CM

## 2023-07-24 DIAGNOSIS — O09.523 SUPERVISION OF HIGH RISK ELDERLY MULTIGRAVIDA IN THIRD TRIMESTER: Primary | ICD-10-CM

## 2023-07-24 DIAGNOSIS — Z64.1 GRAND MULTIPARA: ICD-10-CM

## 2023-07-24 PROCEDURE — 99207 PR PRENATAL VISIT: CPT | Performed by: ADVANCED PRACTICE MIDWIFE

## 2023-07-24 NOTE — PROGRESS NOTES
"Mauro is a  at 38w3d who presents to clinic today for a routine prenatal visit.      Exam:  see prenatal flowsheet    The following topics were covered in today's visit:  Lapse in care.  Last clinic visit was 23 at 32+ weeks.  Has been attending IV iron infusion visits, and calling on-call CNM with questions, but hasn't been seen for prenatal care.  She has been busy at home, feeling baby move,no signs of labor yet.  IV Venofer treatments for iron deficiency anemia.  Has had 3 treatments.  Last HGB <1 week ago on  was 10.1 (improvement from 8.8 on ).  Won't repeat CBC today.  Plan to check it at next visit again.  GBS offered today and pt declines \"I will just do it when I am in labor\". Explained that it is 24 hr before culture result will be ready and advised to do it before SROM or labor but she continues to decline today.  Reviewed CNM on-call number and when to call including for s/s of labor, srom, decreased fetal movement, bleeding, or other warning signs.  Vertex confirmed with handheld ultrasound. Pt declines cervical exam today. Has had blood on toilet paper after voiding for 1.5 weeks (see phone notes) without change. No regular contractions. Denies gush or leaking of fluid.         "

## 2023-08-13 ENCOUNTER — HEALTH MAINTENANCE LETTER (OUTPATIENT)
Age: 38
End: 2023-08-13

## 2023-09-05 ENCOUNTER — MEDICAL CORRESPONDENCE (OUTPATIENT)
Dept: HEALTH INFORMATION MANAGEMENT | Facility: CLINIC | Age: 38
End: 2023-09-05
Payer: COMMERCIAL

## 2024-10-06 ENCOUNTER — HEALTH MAINTENANCE LETTER (OUTPATIENT)
Age: 39
End: 2024-10-06